# Patient Record
Sex: FEMALE | Race: WHITE | NOT HISPANIC OR LATINO | Employment: FULL TIME | URBAN - METROPOLITAN AREA
[De-identification: names, ages, dates, MRNs, and addresses within clinical notes are randomized per-mention and may not be internally consistent; named-entity substitution may affect disease eponyms.]

---

## 2023-08-31 LAB
CREATININE (MG/DL) IN SER/PLAS: 0.82 MG/DL (ref 0.5–1.05)
GFR FEMALE: 86 ML/MIN/1.73M2
THYROTROPIN (MIU/L) IN SER/PLAS BY DETECTION LIMIT <= 0.05 MIU/L: 1.37 MIU/L (ref 0.44–3.98)
UREA NITROGEN (MG/DL) IN SER/PLAS: 11 MG/DL (ref 6–23)

## 2023-09-12 LAB
CLUE CELLS: NORMAL
NUGENT SCORE: 3
URINE CULTURE: NO GROWTH
YEAST: NORMAL

## 2023-10-07 PROBLEM — M54.9 EXACERBATION OF CHRONIC BACK PAIN: Status: ACTIVE | Noted: 2023-10-07

## 2023-10-07 PROBLEM — J98.01 COUGH DUE TO BRONCHOSPASM: Status: ACTIVE | Noted: 2023-10-07

## 2023-10-07 PROBLEM — R06.02 SOB (SHORTNESS OF BREATH): Status: ACTIVE | Noted: 2023-10-07

## 2023-10-07 PROBLEM — R30.0 DYSURIA: Status: ACTIVE | Noted: 2023-10-07

## 2023-10-07 PROBLEM — G89.29 EXACERBATION OF CHRONIC BACK PAIN: Status: ACTIVE | Noted: 2023-10-07

## 2023-10-07 PROBLEM — E04.1 THYROID NODULE: Status: ACTIVE | Noted: 2023-10-07

## 2023-10-07 PROBLEM — R11.15 EMESIS, PERSISTENT: Status: ACTIVE | Noted: 2023-10-07

## 2023-10-07 PROBLEM — N90.89 LABIAL IRRITATION: Status: ACTIVE | Noted: 2023-10-07

## 2023-10-07 RX ORDER — CYCLOBENZAPRINE HCL 5 MG
.5-1 TABLET ORAL NIGHTLY PRN
COMMUNITY
Start: 2018-09-10 | End: 2023-10-24 | Stop reason: ENTERED-IN-ERROR

## 2023-10-07 RX ORDER — LIDOCAINE HYDROCHLORIDE 20 MG/ML
5 SOLUTION OROPHARYNGEAL EVERY 2 HOUR PRN
COMMUNITY
End: 2023-10-24 | Stop reason: ENTERED-IN-ERROR

## 2023-10-07 RX ORDER — PHENAZOPYRIDINE HYDROCHLORIDE 200 MG/1
200 TABLET, FILM COATED ORAL 3 TIMES DAILY PRN
COMMUNITY
End: 2023-10-24 | Stop reason: ENTERED-IN-ERROR

## 2023-10-07 RX ORDER — ALBUTEROL SULFATE 90 UG/1
1-2 AEROSOL, METERED RESPIRATORY (INHALATION) AS NEEDED
COMMUNITY
End: 2023-10-24 | Stop reason: ENTERED-IN-ERROR

## 2023-10-07 RX ORDER — OMEPRAZOLE 40 MG/1
40 CAPSULE, DELAYED RELEASE ORAL DAILY
COMMUNITY
End: 2023-10-24 | Stop reason: ENTERED-IN-ERROR

## 2023-10-09 ENCOUNTER — ANCILLARY PROCEDURE (OUTPATIENT)
Dept: PREADMISSION TESTING | Facility: HOSPITAL | Age: 53
End: 2023-10-09
Payer: COMMERCIAL

## 2023-10-09 ENCOUNTER — LAB (OUTPATIENT)
Dept: LAB | Facility: LAB | Age: 53
End: 2023-10-09
Payer: COMMERCIAL

## 2023-10-09 VITALS
HEART RATE: 76 BPM | BODY MASS INDEX: 26.12 KG/M2 | TEMPERATURE: 97 F | DIASTOLIC BLOOD PRESSURE: 71 MMHG | HEIGHT: 65 IN | RESPIRATION RATE: 16 BRPM | SYSTOLIC BLOOD PRESSURE: 124 MMHG | WEIGHT: 156.75 LBS | OXYGEN SATURATION: 97 %

## 2023-10-09 DIAGNOSIS — Z01.818 PREOP EXAMINATION: ICD-10-CM

## 2023-10-09 DIAGNOSIS — E04.1 NONTOXIC THYROID NODULE: Primary | ICD-10-CM

## 2023-10-09 DIAGNOSIS — E04.1 NONTOXIC THYROID NODULE: ICD-10-CM

## 2023-10-09 LAB
ANION GAP SERPL CALC-SCNC: 9 MMOL/L (ref 10–20)
BUN SERPL-MCNC: 10 MG/DL (ref 6–23)
CALCIUM SERPL-MCNC: 9.2 MG/DL (ref 8.6–10.3)
CHLORIDE SERPL-SCNC: 107 MMOL/L (ref 98–107)
CO2 SERPL-SCNC: 28 MMOL/L (ref 21–32)
CREAT SERPL-MCNC: 0.7 MG/DL (ref 0.5–1.05)
GFR SERPL CREATININE-BSD FRML MDRD: >90 ML/MIN/1.73M*2
GLUCOSE SERPL-MCNC: 90 MG/DL (ref 74–99)
POTASSIUM SERPL-SCNC: 4.2 MMOL/L (ref 3.5–5.3)
SODIUM SERPL-SCNC: 140 MMOL/L (ref 136–145)

## 2023-10-09 PROCEDURE — 36415 COLL VENOUS BLD VENIPUNCTURE: CPT

## 2023-10-09 PROCEDURE — 80048 BASIC METABOLIC PNL TOTAL CA: CPT

## 2023-10-09 PROCEDURE — 99203 OFFICE O/P NEW LOW 30 MIN: CPT

## 2023-10-09 PROCEDURE — 93005 ELECTROCARDIOGRAM TRACING: CPT

## 2023-10-09 PROCEDURE — 93010 ELECTROCARDIOGRAM REPORT: CPT | Performed by: INTERNAL MEDICINE

## 2023-10-09 RX ORDER — IBUPROFEN 200 MG
800 TABLET ORAL EVERY 6 HOURS PRN
COMMUNITY

## 2023-10-09 RX ORDER — PLANT STANOL ESTER 450 MG
8000 TABLET ORAL DAILY
COMMUNITY
End: 2023-10-24 | Stop reason: ENTERED-IN-ERROR

## 2023-10-09 RX ORDER — VIT C/E/ZN/COPPR/LUTEIN/ZEAXAN 250MG-90MG
25 CAPSULE ORAL DAILY
COMMUNITY

## 2023-10-09 ASSESSMENT — DUKE ACTIVITY SCORE INDEX (DASI)
CAN YOU DO HEAVY WORK AROUND THE HOUSE LIKE SCRUBBING FLOORS OR LIFTING AND MOVING HEAVY FURNITURE: YES
CAN YOU PARTICIPATE IN STRENOUS SPORTS LIKE SWIMMING, SINGLES TENNIS, FOOTBALL, BASKETBALL, OR SKIING: YES
CAN YOU RUN A SHORT DISTANCE: YES
CAN YOU WALK A BLOCK OR TWO ON LEVEL GROUND: YES
CAN YOU TAKE CARE OF YOURSELF (EAT, DRESS, BATHE, OR USE TOILET): YES
CAN YOU DO MODERATE WORK AROUND THE HOUSE LIKE VACUUMING, SWEEPING FLOORS OR CARRYING GROCERIES: YES
CAN YOU CLIMB A FLIGHT OF STAIRS OR WALK UP A HILL: YES
CAN YOU DO LIGHT WORK AROUND THE HOUSE LIKE DUSTING OR WASHING DISHES: YES
CAN YOU PARTICIPATE IN MODERATE RECREATIONAL ACTIVITIES LIKE GOLF, BOWLING, DANCING, DOUBLES TENNIS OR THROWING A BASEBALL OR FOOTBALL: YES
CAN YOU WALK INDOORS, SUCH AS AROUND YOUR HOUSE: YES
CAN YOU HAVE SEXUAL RELATIONS: YES

## 2023-10-09 ASSESSMENT — LIFESTYLE VARIABLES: SMOKING_STATUS: SMOKER

## 2023-10-09 ASSESSMENT — CHADS2 SCORE
AGE GREATER THAN OR EQUAL TO 75: NO
HYPERTENSION: NO
PRIOR STROKE OR TIA OR THROMBOEMBOLISM: NO
CHADS2 SCORE: 0
DIABETES: NO
CHF: NO

## 2023-10-09 ASSESSMENT — ACTIVITIES OF DAILY LIVING (ADL): ADL_SCORE: 0

## 2023-10-09 ASSESSMENT — PAIN SCALES - GENERAL: PAINLEVEL_OUTOF10: 0 - NO PAIN

## 2023-10-09 ASSESSMENT — PAIN - FUNCTIONAL ASSESSMENT: PAIN_FUNCTIONAL_ASSESSMENT: 0-10

## 2023-10-09 NOTE — H&P (VIEW-ONLY)
"CPM/PAT Evaluation       Name: Jaki Sarah (Jaki Sarah)  /Age: 1970/52 y.o.     In-Person       Chief Complaint: Goiter    HPI  Pleasant 51 y/o female presents with a goiter. She states she has had a goiter for years now but it has been growing in size. States she has \"all the symptoms\" associated with a goiter. She has \"itching\" of the goiter. Endorses fatigue, muscle cramping/spasms and \"many other symptoms\". Denies recent fever/illness/chills.     Past Medical History:   Diagnosis Date    Anxiety     Asthma     Depression     GERD (gastroesophageal reflux disease)     Goiter     Thyroid nodule        Past Surgical History:   Procedure Laterality Date    ADENOIDECTOMY      MYRINGOTOMY W/ TUBES         Patient  has no history on file for sexual activity.    Family History   Problem Relation Name Age of Onset    Diabetes Father      Kidney failure Father      Other (Heart problem) Father         Allergies   Allergen Reactions    Guaifenesin Hives    Sulfamethoxazole-Trimethoprim Other     Yeast infection       Prior to Admission medications    Medication Sig Start Date End Date Taking? Authorizing Provider   albuterol 90 mcg/actuation inhaler Inhale 1-2 puffs if needed. Every 4-6 hours    Historical Provider, MD   ascorbic acid (VITAMIN C ORAL)     Historical Provider, MD   CALCIUM-MAGNESIUM-ZINC ORAL TABS    Historical Provider, MD   cyclobenzaprine (Flexeril) 5 mg tablet Take 0.5-1 tablets (2.5-5 mg) by mouth as needed at bedtime (pain). 9/10/18   Historical Provider, MD   lidocaine (Xylocaine) 2 % solution Take 5 mL by mouth every 2 hours if needed.    Historical Provider, MD   omeprazole (PriLOSEC) 20 mg DR capsule     Historical Provider, MD   phenazopyridine (Pyridium) 200 mg tablet Take 1 tablet (200 mg) by mouth 3 times a day as needed. After meals    Historical Provider, MD   TURMERIC ORAL     Historical Provider, MD   vitamin E acetate (VITAMIN E ORAL)     Historical Provider, MD    "     Constitutional: Negative for fever, chills, or sweats   ENMT: Negative for nasal discharge, congestion, ear pain, mouth pain, throat pain. Positive for nasal congestion. Positive for goiter.   Respiratory: Negative for cough, hemoptysis, wheezing, shortness of breath. Positive for chronic cough. States due to smoking.    Cardiac: Negative for chest pain, dyspnea on exertion, palpitations, syncope. Recent palpitations at night only. Believes it may be related to stress. Occasional LE swelling  Gastrointestinal: Negative for nausea, vomiting, diarrhea, constipation, abdominal pain  Genitourinary: Negative for dysuria, flank pain, frequency, hematuria   Musculoskeletal: Negative for decreased ROM, pain, swelling, weakness. Positive for muscle cramping/pain.   Neurological: Negative for dizziness, confusion, headache  Psychiatric: Negative for mood changes   Skin: Negative for itching, rash, ulcer    Hematologic/Lymph: Negative for bruising, easy bleeding  Allergic/Immunologic: itching, sneezing, swelling      Physical Exam  Vitals reviewed.   HENT:      Head: Normocephalic.      Nose: Nose normal.      Mouth/Throat:      Mouth: Mucous membranes are moist.      Pharynx: Oropharynx is clear.   Eyes:      Pupils: Pupils are equal, round, and reactive to light.   Neck:      Comments: Goiter    Cardiovascular:      Rate and Rhythm: Normal rate and regular rhythm.   Pulmonary:      Effort: Pulmonary effort is normal.      Breath sounds: Normal breath sounds.   Abdominal:      General: Bowel sounds are normal.      Palpations: Abdomen is soft.   Musculoskeletal:         General: Normal range of motion.      Cervical back: Normal range of motion.   Skin:     General: Skin is warm and dry.   Neurological:      General: No focal deficit present.      Mental Status: She is alert and oriented to person, place, and time.   Psychiatric:         Mood and Affect: Mood normal.        Airway    Visit Vitals  /71   Pulse 76    Temp 36.1 °C (97 °F) (Temporal)   Resp 16       DASI Risk Score    No data to display       Caprini DVT Assessment    No data to display       Modified Frailty Index    No data to display       CHADS2 Stroke Risk  Current as of 6 hours ago        N/A 3 - 100%: High Risk   2 - 3%: Medium Risk   0 - 2%: Low Risk     Last Change: N/A          This score determines the patient's risk of having a stroke if the patient has atrial fibrillation.        This score is not applicable to this patient. Components are not calculated.          Revised Cardiac Risk Index    No data to display       Apfel Simplified Score    No data to display       Risk Analysis Index Results This Encounter    No data found in the last 1 encounters.         Assessment and Plan:     OR with Dr. Mobley on 10/26  BMP ordered  EKG obtained and enclosed. NSR. VR: 67 BPM  Patient has no prior anesthesia complications. Has tolerated anesthesia well in the past.     Patient endorses palpitations recently. She noticed they began when she scheduled this surgery. They occur at night only when she initially lays down and then she feels better after she relaxes in bed. EKG obtained which showed NSR. Heart auscultation was WNL.     Patient has never been officially diagnosed with sleep apnea. She states that she wakes up gasping and sleeps on 3 pillows every night. She is aware that she should have a sleep study completed.       See risk scores as previously documented.

## 2023-10-09 NOTE — CPM/PAT H&P
"CPM/PAT Evaluation       Name: Jaki Sarah (Jaki Sarah)  /Age: 1970/52 y.o.     In-Person       Chief Complaint: Goiter    HPI  Pleasant 53 y/o female presents with a goiter. She states she has had a goiter for years now but it has been growing in size. States she has \"all the symptoms\" associated with a goiter. She has \"itching\" of the goiter. Endorses fatigue, muscle cramping/spasms and \"many other symptoms\". Denies recent fever/illness/chills.     Past Medical History:   Diagnosis Date    Anxiety     Asthma     Depression     GERD (gastroesophageal reflux disease)     Goiter     Thyroid nodule        Past Surgical History:   Procedure Laterality Date    ADENOIDECTOMY      MYRINGOTOMY W/ TUBES         Patient  has no history on file for sexual activity.    Family History   Problem Relation Name Age of Onset    Diabetes Father      Kidney failure Father      Other (Heart problem) Father         Allergies   Allergen Reactions    Guaifenesin Hives    Sulfamethoxazole-Trimethoprim Other     Yeast infection       Prior to Admission medications    Medication Sig Start Date End Date Taking? Authorizing Provider   albuterol 90 mcg/actuation inhaler Inhale 1-2 puffs if needed. Every 4-6 hours    Historical Provider, MD   ascorbic acid (VITAMIN C ORAL)     Historical Provider, MD   CALCIUM-MAGNESIUM-ZINC ORAL TABS    Historical Provider, MD   cyclobenzaprine (Flexeril) 5 mg tablet Take 0.5-1 tablets (2.5-5 mg) by mouth as needed at bedtime (pain). 9/10/18   Historical Provider, MD   lidocaine (Xylocaine) 2 % solution Take 5 mL by mouth every 2 hours if needed.    Historical Provider, MD   omeprazole (PriLOSEC) 20 mg DR capsule     Historical Provider, MD   phenazopyridine (Pyridium) 200 mg tablet Take 1 tablet (200 mg) by mouth 3 times a day as needed. After meals    Historical Provider, MD   TURMERIC ORAL     Historical Provider, MD   vitamin E acetate (VITAMIN E ORAL)     Historical Provider, MD    "     Constitutional: Negative for fever, chills, or sweats   ENMT: Negative for nasal discharge, congestion, ear pain, mouth pain, throat pain. Positive for nasal congestion. Positive for goiter.   Respiratory: Negative for cough, hemoptysis, wheezing, shortness of breath. Positive for chronic cough. States due to smoking.    Cardiac: Negative for chest pain, dyspnea on exertion, palpitations, syncope. Recent palpitations at night only. Believes it may be related to stress. Occasional LE swelling  Gastrointestinal: Negative for nausea, vomiting, diarrhea, constipation, abdominal pain  Genitourinary: Negative for dysuria, flank pain, frequency, hematuria   Musculoskeletal: Negative for decreased ROM, pain, swelling, weakness. Positive for muscle cramping/pain.   Neurological: Negative for dizziness, confusion, headache  Psychiatric: Negative for mood changes   Skin: Negative for itching, rash, ulcer    Hematologic/Lymph: Negative for bruising, easy bleeding  Allergic/Immunologic: itching, sneezing, swelling      Physical Exam  Vitals reviewed.   HENT:      Head: Normocephalic.      Nose: Nose normal.      Mouth/Throat:      Mouth: Mucous membranes are moist.      Pharynx: Oropharynx is clear.   Eyes:      Pupils: Pupils are equal, round, and reactive to light.   Neck:      Comments: Goiter    Cardiovascular:      Rate and Rhythm: Normal rate and regular rhythm.   Pulmonary:      Effort: Pulmonary effort is normal.      Breath sounds: Normal breath sounds.   Abdominal:      General: Bowel sounds are normal.      Palpations: Abdomen is soft.   Musculoskeletal:         General: Normal range of motion.      Cervical back: Normal range of motion.   Skin:     General: Skin is warm and dry.   Neurological:      General: No focal deficit present.      Mental Status: She is alert and oriented to person, place, and time.   Psychiatric:         Mood and Affect: Mood normal.        Airway    Visit Vitals  /71   Pulse 76    Temp 36.1 °C (97 °F) (Temporal)   Resp 16       DASI Risk Score    No data to display       Caprini DVT Assessment    No data to display       Modified Frailty Index    No data to display       CHADS2 Stroke Risk  Current as of 6 hours ago        N/A 3 - 100%: High Risk   2 - 3%: Medium Risk   0 - 2%: Low Risk     Last Change: N/A          This score determines the patient's risk of having a stroke if the patient has atrial fibrillation.        This score is not applicable to this patient. Components are not calculated.          Revised Cardiac Risk Index    No data to display       Apfel Simplified Score    No data to display       Risk Analysis Index Results This Encounter    No data found in the last 1 encounters.         Assessment and Plan:     OR with Dr. Mobley on 10/26  BMP ordered  EKG obtained and enclosed. NSR. VR: 67 BPM  Patient has no prior anesthesia complications. Has tolerated anesthesia well in the past.     Patient endorses palpitations recently. She noticed they began when she scheduled this surgery. They occur at night only when she initially lays down and then she feels better after she relaxes in bed. EKG obtained which showed NSR. Heart auscultation was WNL.     Patient has never been officially diagnosed with sleep apnea. She states that she wakes up gasping and sleeps on 3 pillows every night. She is aware that she should have a sleep study completed.       See risk scores as previously documented.

## 2023-10-09 NOTE — PREPROCEDURE INSTRUCTIONS
Medication List            Accurate as of October 9, 2023  2:33 PM. Always use your most recent med list.                albuterol 90 mcg/actuation inhaler  Medication Adjustments for Surgery: Take morning of surgery with sip of water, no other fluids     B complex-vitamin C-folic acid 1- mg-mg-mcg tablet  Commonly known as: Nephro-[facility] Rx  Medication Adjustments for Surgery: Stop 7 days before surgery     CALCIUM-MAGNESIUM-ZINC ORAL  Medication Adjustments for Surgery: Stop 7 days before surgery     cholecalciferol 25 MCG (1000 UT) capsule  Commonly known as: Vitamin D-3  Medication Adjustments for Surgery: Continue until night before surgery     cyanocobalamin 50 mcg tablet  Commonly known as: Vitamin B-12  Medication Adjustments for Surgery: Stop 7 days before surgery     cyclobenzaprine 5 mg tablet  Commonly known as: Flexeril  Medication Adjustments for Surgery: Other (Comment)  Notes to patient: Not taking      ibuprofen 200 mg tablet  Medication Adjustments for Surgery: Stop 7 days before surgery     lidocaine 2 % solution  Commonly known as: Xylocaine  Medication Adjustments for Surgery: Stop 1 day before surgery     omeprazole 40 mg DR capsule  Commonly known as: PriLOSEC  Medication Adjustments for Surgery: Take morning of surgery with sip of water, no other fluids     phenazopyridine 200 mg tablet  Commonly known as: Pyridium  Medication Adjustments for Surgery: Continue until night before surgery     TURMERIC ORAL  Medication Adjustments for Surgery: Stop 7 days before surgery     vitamin A 2,400 mcg capsule  Medication Adjustments for Surgery: Stop 7 days before surgery     VITAMIN C ORAL  Medication Adjustments for Surgery: Stop 7 days before surgery     VITAMIN E ORAL  Medication Adjustments for Surgery: Stop 7 days before surgery                      PRE-OPERATIVE INSTRUCTIONS    You will receive notification one business day prior to your surgery to confirm your arrival time and additional  information. It is important that you answer your phone and/or check your messages during this time.    Please enter the building through the Outpatient entrance. Take the elevator off the lobby to the 2nd floor and check in at the Outpatient Surgery desk    INSTRUCTIONS:  Talk to your surgeon for instructions if you should stop your aspirin, blood thinner, or diabetes medicines.  DO NOT take any multivitamins or over the counter supplements for 7-10 days before surgery.  If not being admitted, you must have an adult immediately available to drive you home after surgery. We also highly recommend you have someone stay with you for the entire day and night of your surgery.  For children having surgery, a parent or legal guardian must accompany t hem to the surgery center. If this is not possible, please call [phone] to make additional arrangements.  For adults who are unable to consent or make medical decisions for themselves, a legal guardian or Power of  must accompany them to the surgery center. If this is not possible, please call [phone] to make additional arrangements.  Wear comfortable, loose fitting clothing.  All jewelry and piercings must be removed. If you are unable to remove an item or have a dermal piercing, please be sure to tell the nurse when you arrive for surgery.  Nail polish and make-up must be removed.  Avoid smoking or consuming alcohol for 24 hours before surgery.  To help prevent infection, please take a shower/bath and wash your hair the night before and/or morning of surgery.    Additional instructions about eating and drinking before surgery:  Do not eat any solid foods or drink anything but clear liquids within 6 hours of your arrival time for surgery. Milk, nutritional drinks/supplements, and infant formula are considered solid foods.  You may drink clear liquids up to 2 hours before your arrival time for surgery, unless directed otherwise by your surgeon. Clear liquids  include water, non-carbonated sports drinks (Gatorade), black tea or coffee (no creamers) and breast milk.    If you received a blue folder, please review additional information provided inside the folder regarding additional preparation.     If you have any questions or concerns, please call Pre-Admission Testing at [phone].

## 2023-10-11 LAB
ATRIAL RATE: 67 BPM
P AXIS: 65 DEGREES
P OFFSET: 192 MS
P ONSET: 145 MS
PR INTERVAL: 148 MS
Q ONSET: 219 MS
QRS COUNT: 11 BEATS
QRS DURATION: 78 MS
QT INTERVAL: 386 MS
QTC CALCULATION(BAZETT): 407 MS
QTC FREDERICIA: 400 MS
R AXIS: 71 DEGREES
T AXIS: 61 DEGREES
T OFFSET: 412 MS
VENTRICULAR RATE: 67 BPM

## 2023-10-24 RX ORDER — OMEPRAZOLE 20 MG/1
20 TABLET, DELAYED RELEASE ORAL DAILY PRN
COMMUNITY

## 2023-10-26 ENCOUNTER — HOSPITAL ENCOUNTER (OUTPATIENT)
Facility: HOSPITAL | Age: 53
Setting detail: OBSERVATION
Discharge: HOME | End: 2023-10-27
Attending: OTOLARYNGOLOGY | Admitting: OTOLARYNGOLOGY
Payer: COMMERCIAL

## 2023-10-26 ENCOUNTER — ANESTHESIA (OUTPATIENT)
Dept: OPERATING ROOM | Facility: HOSPITAL | Age: 53
End: 2023-10-26
Payer: COMMERCIAL

## 2023-10-26 ENCOUNTER — ANESTHESIA EVENT (OUTPATIENT)
Dept: OPERATING ROOM | Facility: HOSPITAL | Age: 53
End: 2023-10-26
Payer: COMMERCIAL

## 2023-10-26 DIAGNOSIS — G89.18 ACUTE POSTOPERATIVE PAIN: ICD-10-CM

## 2023-10-26 DIAGNOSIS — E04.9 GOITER: Primary | ICD-10-CM

## 2023-10-26 DIAGNOSIS — E04.1 THYROID NODULE: ICD-10-CM

## 2023-10-26 DIAGNOSIS — E04.1 NONTOXIC SINGLE THYROID NODULE: ICD-10-CM

## 2023-10-26 PROBLEM — J45.909 ASTHMA (HHS-HCC): Status: ACTIVE | Noted: 2023-10-26

## 2023-10-26 LAB
HCG UR QL IA.RAPID: NEGATIVE
PTH-INTACT IO % DIF SERPL: 70 PG/ML (ref 12–88)

## 2023-10-26 PROCEDURE — 36415 COLL VENOUS BLD VENIPUNCTURE: CPT | Performed by: STUDENT IN AN ORGANIZED HEALTH CARE EDUCATION/TRAINING PROGRAM

## 2023-10-26 PROCEDURE — 3600000009 HC OR TIME - EACH INCREMENTAL 1 MINUTE - PROCEDURE LEVEL FOUR: Performed by: OTOLARYNGOLOGY

## 2023-10-26 PROCEDURE — 7100000001 HC RECOVERY ROOM TIME - INITIAL BASE CHARGE: Performed by: OTOLARYNGOLOGY

## 2023-10-26 PROCEDURE — 81025 URINE PREGNANCY TEST: CPT | Performed by: OTOLARYNGOLOGY

## 2023-10-26 PROCEDURE — 2500000005 HC RX 250 GENERAL PHARMACY W/O HCPCS: Performed by: OTOLARYNGOLOGY

## 2023-10-26 PROCEDURE — 7100000002 HC RECOVERY ROOM TIME - EACH INCREMENTAL 1 MINUTE: Performed by: OTOLARYNGOLOGY

## 2023-10-26 PROCEDURE — 2500000004 HC RX 250 GENERAL PHARMACY W/ HCPCS (ALT 636 FOR OP/ED): Performed by: STUDENT IN AN ORGANIZED HEALTH CARE EDUCATION/TRAINING PROGRAM

## 2023-10-26 PROCEDURE — 2500000004 HC RX 250 GENERAL PHARMACY W/ HCPCS (ALT 636 FOR OP/ED): Performed by: ANESTHESIOLOGIST ASSISTANT

## 2023-10-26 PROCEDURE — G0378 HOSPITAL OBSERVATION PER HR: HCPCS

## 2023-10-26 PROCEDURE — 88307 TISSUE EXAM BY PATHOLOGIST: CPT | Performed by: PATHOLOGY

## 2023-10-26 PROCEDURE — 2720000007 HC OR 272 NO HCPCS: Performed by: OTOLARYNGOLOGY

## 2023-10-26 PROCEDURE — 3700000001 HC GENERAL ANESTHESIA TIME - INITIAL BASE CHARGE: Performed by: OTOLARYNGOLOGY

## 2023-10-26 PROCEDURE — 2500000005 HC RX 250 GENERAL PHARMACY W/O HCPCS: Performed by: ANESTHESIOLOGIST ASSISTANT

## 2023-10-26 PROCEDURE — A60240 PR THYROIDECTOMY: Performed by: ANESTHESIOLOGY

## 2023-10-26 PROCEDURE — 3700000002 HC GENERAL ANESTHESIA TIME - EACH INCREMENTAL 1 MINUTE: Performed by: OTOLARYNGOLOGY

## 2023-10-26 PROCEDURE — 31525 DX LARYNGOSCOPY EXCL NB: CPT | Performed by: OTOLARYNGOLOGY

## 2023-10-26 PROCEDURE — 96372 THER/PROPH/DIAG INJ SC/IM: CPT | Performed by: STUDENT IN AN ORGANIZED HEALTH CARE EDUCATION/TRAINING PROGRAM

## 2023-10-26 PROCEDURE — 3600000004 HC OR TIME - INITIAL BASE CHARGE - PROCEDURE LEVEL FOUR: Performed by: OTOLARYNGOLOGY

## 2023-10-26 PROCEDURE — 0754T DGTZ GLS MCRSCP SLD LEVEL V: CPT | Mod: TC,STJLAB | Performed by: OTOLARYNGOLOGY

## 2023-10-26 PROCEDURE — 60240 REMOVAL OF THYROID: CPT | Performed by: OTOLARYNGOLOGY

## 2023-10-26 PROCEDURE — A60240 PR THYROIDECTOMY: Performed by: ANESTHESIOLOGIST ASSISTANT

## 2023-10-26 PROCEDURE — 2500000004 HC RX 250 GENERAL PHARMACY W/ HCPCS (ALT 636 FOR OP/ED): Performed by: ANESTHESIOLOGY

## 2023-10-26 PROCEDURE — 83970 ASSAY OF PARATHORMONE: CPT | Performed by: STUDENT IN AN ORGANIZED HEALTH CARE EDUCATION/TRAINING PROGRAM

## 2023-10-26 RX ORDER — LIDOCAINE HYDROCHLORIDE 20 MG/ML
INJECTION, SOLUTION EPIDURAL; INFILTRATION; INTRACAUDAL; PERINEURAL AS NEEDED
Status: DISCONTINUED | OUTPATIENT
Start: 2023-10-26 | End: 2023-10-26

## 2023-10-26 RX ORDER — ONDANSETRON HYDROCHLORIDE 2 MG/ML
INJECTION, SOLUTION INTRAVENOUS AS NEEDED
Status: DISCONTINUED | OUTPATIENT
Start: 2023-10-26 | End: 2023-10-26

## 2023-10-26 RX ORDER — GLYCOPYRROLATE 0.2 MG/ML
INJECTION INTRAMUSCULAR; INTRAVENOUS AS NEEDED
Status: DISCONTINUED | OUTPATIENT
Start: 2023-10-26 | End: 2023-10-26

## 2023-10-26 RX ORDER — DIPHENHYDRAMINE HYDROCHLORIDE 50 MG/ML
12.5 INJECTION INTRAMUSCULAR; INTRAVENOUS ONCE AS NEEDED
Status: DISCONTINUED | OUTPATIENT
Start: 2023-10-26 | End: 2023-10-26 | Stop reason: HOSPADM

## 2023-10-26 RX ORDER — ACETAMINOPHEN 325 MG/1
975 TABLET ORAL EVERY 8 HOURS SCHEDULED
Status: DISCONTINUED | OUTPATIENT
Start: 2023-10-26 | End: 2023-10-27 | Stop reason: HOSPADM

## 2023-10-26 RX ORDER — OXYCODONE AND ACETAMINOPHEN 5; 325 MG/1; MG/1
1 TABLET ORAL EVERY 4 HOURS PRN
Status: DISCONTINUED | OUTPATIENT
Start: 2023-10-26 | End: 2023-10-26 | Stop reason: HOSPADM

## 2023-10-26 RX ORDER — PROPOFOL 10 MG/ML
INJECTION, EMULSION INTRAVENOUS AS NEEDED
Status: DISCONTINUED | OUTPATIENT
Start: 2023-10-26 | End: 2023-10-26

## 2023-10-26 RX ORDER — LIDOCAINE HYDROCHLORIDE AND EPINEPHRINE 10; 10 MG/ML; UG/ML
INJECTION, SOLUTION INFILTRATION; PERINEURAL AS NEEDED
Status: DISCONTINUED | OUTPATIENT
Start: 2023-10-26 | End: 2023-10-26 | Stop reason: HOSPADM

## 2023-10-26 RX ORDER — HYDRALAZINE HYDROCHLORIDE 20 MG/ML
5 INJECTION INTRAMUSCULAR; INTRAVENOUS EVERY 30 MIN PRN
Status: DISCONTINUED | OUTPATIENT
Start: 2023-10-26 | End: 2023-10-26 | Stop reason: HOSPADM

## 2023-10-26 RX ORDER — LEVOTHYROXINE SODIUM 112 UG/1
112 TABLET ORAL
Status: DISCONTINUED | OUTPATIENT
Start: 2023-10-27 | End: 2023-10-27 | Stop reason: HOSPADM

## 2023-10-26 RX ORDER — NALOXONE HYDROCHLORIDE 0.4 MG/ML
0.2 INJECTION, SOLUTION INTRAMUSCULAR; INTRAVENOUS; SUBCUTANEOUS EVERY 5 MIN PRN
Status: DISCONTINUED | OUTPATIENT
Start: 2023-10-26 | End: 2023-10-27 | Stop reason: HOSPADM

## 2023-10-26 RX ORDER — FENTANYL CITRATE 50 UG/ML
INJECTION, SOLUTION INTRAMUSCULAR; INTRAVENOUS AS NEEDED
Status: DISCONTINUED | OUTPATIENT
Start: 2023-10-26 | End: 2023-10-26

## 2023-10-26 RX ORDER — SUCCINYLCHOLINE CHLORIDE 100 MG/5ML
SYRINGE (ML) INTRAVENOUS AS NEEDED
Status: DISCONTINUED | OUTPATIENT
Start: 2023-10-26 | End: 2023-10-26

## 2023-10-26 RX ORDER — MIDAZOLAM HYDROCHLORIDE 1 MG/ML
INJECTION, SOLUTION INTRAMUSCULAR; INTRAVENOUS AS NEEDED
Status: DISCONTINUED | OUTPATIENT
Start: 2023-10-26 | End: 2023-10-26

## 2023-10-26 RX ORDER — LABETALOL HYDROCHLORIDE 5 MG/ML
5 INJECTION, SOLUTION INTRAVENOUS ONCE AS NEEDED
Status: DISCONTINUED | OUTPATIENT
Start: 2023-10-26 | End: 2023-10-26 | Stop reason: HOSPADM

## 2023-10-26 RX ORDER — HYDROMORPHONE HYDROCHLORIDE 1 MG/ML
0.2 INJECTION, SOLUTION INTRAMUSCULAR; INTRAVENOUS; SUBCUTANEOUS EVERY 5 MIN PRN
Status: DISCONTINUED | OUTPATIENT
Start: 2023-10-26 | End: 2023-10-26 | Stop reason: HOSPADM

## 2023-10-26 RX ORDER — PHENYLEPHRINE 10 MG/250 ML(40 MCG/ML)IN 0.9 % SOD.CHLORIDE INTRAVENOUS
CONTINUOUS PRN
Status: DISCONTINUED | OUTPATIENT
Start: 2023-10-26 | End: 2023-10-26

## 2023-10-26 RX ORDER — ONDANSETRON HYDROCHLORIDE 2 MG/ML
4 INJECTION, SOLUTION INTRAVENOUS EVERY 8 HOURS PRN
Status: DISCONTINUED | OUTPATIENT
Start: 2023-10-26 | End: 2023-10-27 | Stop reason: HOSPADM

## 2023-10-26 RX ORDER — ONDANSETRON 4 MG/1
4 TABLET, ORALLY DISINTEGRATING ORAL EVERY 8 HOURS PRN
Status: DISCONTINUED | OUTPATIENT
Start: 2023-10-26 | End: 2023-10-27 | Stop reason: HOSPADM

## 2023-10-26 RX ORDER — ENOXAPARIN SODIUM 100 MG/ML
40 INJECTION SUBCUTANEOUS EVERY 24 HOURS
Status: DISCONTINUED | OUTPATIENT
Start: 2023-10-26 | End: 2023-10-27 | Stop reason: HOSPADM

## 2023-10-26 RX ORDER — OXYCODONE HYDROCHLORIDE 5 MG/1
2.5 TABLET ORAL EVERY 4 HOURS PRN
Status: DISCONTINUED | OUTPATIENT
Start: 2023-10-26 | End: 2023-10-27 | Stop reason: HOSPADM

## 2023-10-26 RX ORDER — CEFAZOLIN SODIUM 2 G/100ML
INJECTION, SOLUTION INTRAVENOUS AS NEEDED
Status: DISCONTINUED | OUTPATIENT
Start: 2023-10-26 | End: 2023-10-26

## 2023-10-26 RX ORDER — ALBUTEROL SULFATE 0.83 MG/ML
2.5 SOLUTION RESPIRATORY (INHALATION) ONCE AS NEEDED
Status: DISCONTINUED | OUTPATIENT
Start: 2023-10-26 | End: 2023-10-26 | Stop reason: HOSPADM

## 2023-10-26 RX ORDER — HYDROMORPHONE HYDROCHLORIDE 1 MG/ML
INJECTION, SOLUTION INTRAMUSCULAR; INTRAVENOUS; SUBCUTANEOUS AS NEEDED
Status: DISCONTINUED | OUTPATIENT
Start: 2023-10-26 | End: 2023-10-26

## 2023-10-26 RX ORDER — SODIUM CHLORIDE, SODIUM LACTATE, POTASSIUM CHLORIDE, CALCIUM CHLORIDE 600; 310; 30; 20 MG/100ML; MG/100ML; MG/100ML; MG/100ML
100 INJECTION, SOLUTION INTRAVENOUS CONTINUOUS
Status: DISCONTINUED | OUTPATIENT
Start: 2023-10-26 | End: 2023-10-26 | Stop reason: HOSPADM

## 2023-10-26 RX ORDER — TRAMADOL HYDROCHLORIDE 50 MG/1
50 TABLET ORAL EVERY 4 HOURS PRN
Status: DISCONTINUED | OUTPATIENT
Start: 2023-10-26 | End: 2023-10-27 | Stop reason: HOSPADM

## 2023-10-26 RX ORDER — OXYCODONE HYDROCHLORIDE 5 MG/1
5 TABLET ORAL EVERY 4 HOURS PRN
Status: DISCONTINUED | OUTPATIENT
Start: 2023-10-26 | End: 2023-10-26 | Stop reason: HOSPADM

## 2023-10-26 RX ORDER — ONDANSETRON HYDROCHLORIDE 2 MG/ML
4 INJECTION, SOLUTION INTRAVENOUS ONCE AS NEEDED
Status: COMPLETED | OUTPATIENT
Start: 2023-10-26 | End: 2023-10-26

## 2023-10-26 RX ORDER — POLYETHYLENE GLYCOL 3350 17 G/17G
17 POWDER, FOR SOLUTION ORAL DAILY
Status: DISCONTINUED | OUTPATIENT
Start: 2023-10-26 | End: 2023-10-27 | Stop reason: HOSPADM

## 2023-10-26 RX ORDER — DEXAMETHASONE SODIUM PHOSPHATE 100 MG/10ML
INJECTION INTRAMUSCULAR; INTRAVENOUS AS NEEDED
Status: DISCONTINUED | OUTPATIENT
Start: 2023-10-26 | End: 2023-10-26

## 2023-10-26 RX ORDER — LIDOCAINE HYDROCHLORIDE 10 MG/ML
0.1 INJECTION, SOLUTION EPIDURAL; INFILTRATION; INTRACAUDAL; PERINEURAL ONCE
Status: DISCONTINUED | OUTPATIENT
Start: 2023-10-26 | End: 2023-10-26 | Stop reason: HOSPADM

## 2023-10-26 RX ORDER — HYDROMORPHONE HYDROCHLORIDE 1 MG/ML
0.5 INJECTION, SOLUTION INTRAMUSCULAR; INTRAVENOUS; SUBCUTANEOUS EVERY 5 MIN PRN
Status: DISCONTINUED | OUTPATIENT
Start: 2023-10-26 | End: 2023-10-26 | Stop reason: HOSPADM

## 2023-10-26 RX ORDER — PHENYLEPHRINE HCL IN 0.9% NACL 1 MG/10 ML
SYRINGE (ML) INTRAVENOUS AS NEEDED
Status: DISCONTINUED | OUTPATIENT
Start: 2023-10-26 | End: 2023-10-26

## 2023-10-26 RX ORDER — ACETAMINOPHEN 325 MG/1
975 TABLET ORAL ONCE
Status: DISCONTINUED | OUTPATIENT
Start: 2023-10-26 | End: 2023-10-26 | Stop reason: HOSPADM

## 2023-10-26 RX ADMIN — Medication 100 MCG: at 12:38

## 2023-10-26 RX ADMIN — ACETAMINOPHEN 975 MG: 325 TABLET ORAL at 17:09

## 2023-10-26 RX ADMIN — PROPOFOL 100 MG: 10 INJECTION, EMULSION INTRAVENOUS at 12:34

## 2023-10-26 RX ADMIN — Medication 2 L/MIN: at 16:45

## 2023-10-26 RX ADMIN — Medication 0.8 MCG/KG/MIN: at 12:35

## 2023-10-26 RX ADMIN — HYDROMORPHONE HYDROCHLORIDE 0.25 MG: 1 INJECTION, SOLUTION INTRAMUSCULAR; INTRAVENOUS; SUBCUTANEOUS at 13:38

## 2023-10-26 RX ADMIN — CEFAZOLIN SODIUM 2 G: 2 INJECTION, SOLUTION INTRAVENOUS at 12:27

## 2023-10-26 RX ADMIN — LIDOCAINE HYDROCHLORIDE 60 MG: 20 INJECTION, SOLUTION EPIDURAL; INFILTRATION; INTRACAUDAL; PERINEURAL at 12:24

## 2023-10-26 RX ADMIN — REMIFENTANIL HYDROCHLORIDE 0.06 MCG/KG/MIN: 1 INJECTION, POWDER, LYOPHILIZED, FOR SOLUTION INTRAVENOUS at 12:31

## 2023-10-26 RX ADMIN — SODIUM CHLORIDE, SODIUM LACTATE, POTASSIUM CHLORIDE, AND CALCIUM CHLORIDE: 600; 310; 30; 20 INJECTION, SOLUTION INTRAVENOUS at 12:19

## 2023-10-26 RX ADMIN — DEXAMETHASONE SODIUM PHOSPHATE 10 MG: 10 INJECTION INTRAMUSCULAR; INTRAVENOUS at 12:27

## 2023-10-26 RX ADMIN — HYDROMORPHONE HYDROCHLORIDE 0.25 MG: 1 INJECTION, SOLUTION INTRAMUSCULAR; INTRAVENOUS; SUBCUTANEOUS at 13:49

## 2023-10-26 RX ADMIN — FENTANYL CITRATE 50 MCG: 50 INJECTION, SOLUTION INTRAMUSCULAR; INTRAVENOUS at 12:24

## 2023-10-26 RX ADMIN — MIDAZOLAM 2 MG: 1 INJECTION INTRAMUSCULAR; INTRAVENOUS at 12:19

## 2023-10-26 RX ADMIN — HYDROMORPHONE HYDROCHLORIDE 0.5 MG: 1 INJECTION, SOLUTION INTRAMUSCULAR; INTRAVENOUS; SUBCUTANEOUS at 14:43

## 2023-10-26 RX ADMIN — FENTANYL CITRATE 50 MCG: 50 INJECTION, SOLUTION INTRAMUSCULAR; INTRAVENOUS at 12:29

## 2023-10-26 RX ADMIN — Medication 100 MG: at 12:24

## 2023-10-26 RX ADMIN — ONDANSETRON 4 MG: 2 INJECTION INTRAMUSCULAR; INTRAVENOUS at 14:18

## 2023-10-26 RX ADMIN — PROPOFOL 50 MG: 10 INJECTION, EMULSION INTRAVENOUS at 12:29

## 2023-10-26 RX ADMIN — HYDROMORPHONE HYDROCHLORIDE 0.5 MG: 1 INJECTION, SOLUTION INTRAMUSCULAR; INTRAVENOUS; SUBCUTANEOUS at 15:30

## 2023-10-26 RX ADMIN — HYDROMORPHONE HYDROCHLORIDE 0.5 MG: 1 INJECTION, SOLUTION INTRAMUSCULAR; INTRAVENOUS; SUBCUTANEOUS at 15:53

## 2023-10-26 RX ADMIN — Medication 200 MCG: at 12:40

## 2023-10-26 RX ADMIN — PROPOFOL 50 MG: 10 INJECTION, EMULSION INTRAVENOUS at 13:48

## 2023-10-26 RX ADMIN — PROPOFOL 150 MG: 10 INJECTION, EMULSION INTRAVENOUS at 12:24

## 2023-10-26 RX ADMIN — ENOXAPARIN SODIUM 40 MG: 40 INJECTION SUBCUTANEOUS at 18:15

## 2023-10-26 RX ADMIN — GLYCOPYRROLATE 0.2 MG: 0.2 INJECTION, SOLUTION INTRAMUSCULAR; INTRAVENOUS at 13:16

## 2023-10-26 RX ADMIN — ONDANSETRON 4 MG: 2 INJECTION INTRAMUSCULAR; INTRAVENOUS at 15:37

## 2023-10-26 SDOH — SOCIAL STABILITY: SOCIAL INSECURITY: ARE YOU OR HAVE YOU BEEN THREATENED OR ABUSED PHYSICALLY, EMOTIONALLY, OR SEXUALLY BY ANYONE?: NO

## 2023-10-26 SDOH — SOCIAL STABILITY: SOCIAL INSECURITY: WERE YOU ABLE TO COMPLETE ALL THE BEHAVIORAL HEALTH SCREENINGS?: YES

## 2023-10-26 SDOH — SOCIAL STABILITY: SOCIAL INSECURITY: DO YOU FEEL ANYONE HAS EXPLOITED OR TAKEN ADVANTAGE OF YOU FINANCIALLY OR OF YOUR PERSONAL PROPERTY?: NO

## 2023-10-26 SDOH — SOCIAL STABILITY: SOCIAL INSECURITY: HAVE YOU HAD THOUGHTS OF HARMING ANYONE ELSE?: NO

## 2023-10-26 SDOH — SOCIAL STABILITY: SOCIAL INSECURITY: DO YOU FEEL UNSAFE GOING BACK TO THE PLACE WHERE YOU ARE LIVING?: NO

## 2023-10-26 SDOH — SOCIAL STABILITY: SOCIAL INSECURITY: ARE THERE ANY APPARENT SIGNS OF INJURIES/BEHAVIORS THAT COULD BE RELATED TO ABUSE/NEGLECT?: NO

## 2023-10-26 SDOH — HEALTH STABILITY: MENTAL HEALTH: CURRENT SMOKER: 1

## 2023-10-26 SDOH — SOCIAL STABILITY: SOCIAL INSECURITY: ABUSE: ADULT

## 2023-10-26 SDOH — SOCIAL STABILITY: SOCIAL INSECURITY: HAS ANYONE EVER THREATENED TO HURT YOUR FAMILY OR YOUR PETS?: NO

## 2023-10-26 SDOH — SOCIAL STABILITY: SOCIAL INSECURITY: DOES ANYONE TRY TO KEEP YOU FROM HAVING/CONTACTING OTHER FRIENDS OR DOING THINGS OUTSIDE YOUR HOME?: NO

## 2023-10-26 ASSESSMENT — LIFESTYLE VARIABLES
HOW OFTEN DO YOU HAVE 6 OR MORE DRINKS ON ONE OCCASION: NEVER
HOW OFTEN DO YOU HAVE A DRINK CONTAINING ALCOHOL: MONTHLY OR LESS
PRESCIPTION_ABUSE_PAST_12_MONTHS: NO
SKIP TO QUESTIONS 9-10: 1
AUDIT-C TOTAL SCORE: 1
AUDIT-C TOTAL SCORE: 1
SUBSTANCE_ABUSE_PAST_12_MONTHS: NO
HOW MANY STANDARD DRINKS CONTAINING ALCOHOL DO YOU HAVE ON A TYPICAL DAY: 1 OR 2

## 2023-10-26 ASSESSMENT — PATIENT HEALTH QUESTIONNAIRE - PHQ9
1. LITTLE INTEREST OR PLEASURE IN DOING THINGS: NOT AT ALL
2. FEELING DOWN, DEPRESSED OR HOPELESS: NOT AT ALL
SUM OF ALL RESPONSES TO PHQ9 QUESTIONS 1 & 2: 0

## 2023-10-26 ASSESSMENT — COGNITIVE AND FUNCTIONAL STATUS - GENERAL
DAILY ACTIVITIY SCORE: 24
PATIENT BASELINE BEDBOUND: NO
MOBILITY SCORE: 24

## 2023-10-26 ASSESSMENT — PAIN - FUNCTIONAL ASSESSMENT
PAIN_FUNCTIONAL_ASSESSMENT: 0-10

## 2023-10-26 ASSESSMENT — ACTIVITIES OF DAILY LIVING (ADL)
BATHING: INDEPENDENT
PATIENT'S MEMORY ADEQUATE TO SAFELY COMPLETE DAILY ACTIVITIES?: YES
FEEDING YOURSELF: INDEPENDENT
HEARING - LEFT EAR: FUNCTIONAL
HEARING - RIGHT EAR: FUNCTIONAL
WALKS IN HOME: INDEPENDENT
LACK_OF_TRANSPORTATION: NO
ADEQUATE_TO_COMPLETE_ADL: YES
TOILETING: INDEPENDENT
GROOMING: INDEPENDENT
JUDGMENT_ADEQUATE_SAFELY_COMPLETE_DAILY_ACTIVITIES: YES
DRESSING YOURSELF: INDEPENDENT

## 2023-10-26 ASSESSMENT — PAIN DESCRIPTION - DESCRIPTORS
DESCRIPTORS: DISCOMFORT

## 2023-10-26 ASSESSMENT — COLUMBIA-SUICIDE SEVERITY RATING SCALE - C-SSRS
2. HAVE YOU ACTUALLY HAD ANY THOUGHTS OF KILLING YOURSELF?: NO
6. HAVE YOU EVER DONE ANYTHING, STARTED TO DO ANYTHING, OR PREPARED TO DO ANYTHING TO END YOUR LIFE?: NO
1. IN THE PAST MONTH, HAVE YOU WISHED YOU WERE DEAD OR WISHED YOU COULD GO TO SLEEP AND NOT WAKE UP?: NO

## 2023-10-26 ASSESSMENT — PAIN SCALES - GENERAL
PAINLEVEL_OUTOF10: 0 - NO PAIN
PAINLEVEL_OUTOF10: 0 - NO PAIN
PAINLEVEL_OUTOF10: 4
PAINLEVEL_OUTOF10: 0 - NO PAIN
PAINLEVEL_OUTOF10: 7
PAINLEVEL_OUTOF10: 0 - NO PAIN
PAINLEVEL_OUTOF10: 6
PAINLEVEL_OUTOF10: 0 - NO PAIN
PAINLEVEL_OUTOF10: 3

## 2023-10-26 NOTE — ANESTHESIA POSTPROCEDURE EVALUATION
Patient: Jaki Sarah    Procedure Summary       Date: 10/26/23 Room / Location: [facility] OR 04 / Virtual [facility] OR    Anesthesia Start: 1219 Anesthesia Stop: 1449    Procedure: TOTAL THYROIDECTOMY W/NERVE MONITOR (Neck) Diagnosis:       Nontoxic single thyroid nodule      (Nontoxic single thyroid nodule)    Surgeons: Quang Mobley MD Responsible Provider: Enmanuel Figueroa MD    Anesthesia Type: general ASA Status: 2            Anesthesia Type: general    Vitals Value Taken Time   /78 10/26/23 1615   Temp 36 °C (96.8 °F) 10/26/23 1615   Pulse 64 10/26/23 1630   Resp 12 10/26/23 1630   SpO2 97 % 10/26/23 1630       Anesthesia Post Evaluation    Patient location during evaluation: PACU  Patient participation: complete - patient participated  Level of consciousness: awake and alert  Pain management: satisfactory to patient  There was medical reason for not using a multimodal analgesia pain management approach.Airway patency: patent  There was medical reason for not screening for obstructive sleep apnea and/or not using of two or more mitigation strategies.Cardiovascular status: acceptable  Respiratory status: acceptable  Hydration status: euvolemic        No notable events documented.

## 2023-10-26 NOTE — ANESTHESIA PROCEDURE NOTES
Airway  Date/Time: 10/26/2023 12:25 PM  Urgency: elective    Airway not difficult    Staffing  Performed: [facility] and [facility]   Authorized by: Enmanuel Figueroa MD    Performed by: JARRELL Leon  Patient location during procedure: OR    Indications and Patient Condition  Indications for airway management: anesthesia and airway protection  Spontaneous ventilation: present  Sedation level: deep  Preoxygenated: yes  Patient position: sniffing  Mask difficulty assessment: 1 - vent by mask  Planned trial extubation    Final Airway Details  Final airway type: endotracheal airway      Successful airway: NIM tube  Cuffed: yes   Successful intubation technique: direct laryngoscopy  Facilitating devices/methods: intubating stylet  Endotracheal tube insertion site: oral  Blade type: glidescope.  Blade size: #3  Cormack-Lehane Classification: grade I - full view of glottis  Placement verified by: chest auscultation and capnometry   Measured from: gums  ETT to gums (cm): 22  Number of attempts at approach: 1    Additional Comments  atraumatic

## 2023-10-26 NOTE — DISCHARGE INSTRUCTIONS
[facility] DEPARTMENT OF OTOLARYNGOLOGY (ENT):  THYROIDECTOMY/PARATHYROIDECTOMY POST-OPERATIVE INSTRUCTIONS    Operations performed while hospitalized:   Thyroidectomy    Treatment/wound care:   Keep area(s) clean and dry.   It is okay to shower 48 hours after time of surgery.    Do not scrub wound(s), pat dry.    Do not submerge wound(s) in standing water until seen in followup (no tub bathing, swimming, or hot tubs).    Please visually inspect your wound(s) at least once daily.  If the wound(s) are in a difficult to see location, please use a mirror or have someone else assist with visual inspection.call if wound(s) or surrounding area have increased swelling, pain, warmth, redness, or drainage that is thick, yellow and/or green.  You have a drain in place that will remain in until Monday to prevent fluid from building up under the skin.  Remove cap and empty the drain 1-2 times daily.  Record output.  Squeeze the bulb and replace the cap to reactivate drain    If you see white bandages on your neck:  You have steri strips (small paper tape) along your incision. You can shower, pat dry; do not soak in a tub.  The steri strips will fall off on their own; do not peel them off.    Expected Post-Surgical Symptoms:  You may experience neck pain and a hoarse/weak voice. These symptoms are often temporary and may be due to irritation from the breathing tube that's inserted during surgery.  Swallowing difficulties due to pain for several days.    Pain Control:    Adequate management:   Tylenol, motrin, and tramadol can be taken as prescribed as needed for breakthrough pain.      Activity after Discharge:   When you go home, you can usually return to your regular activities.  Avoid strenuous activities, such as bicycle riding, jogging, weight lifting, or aerobic exercise, for at least 2 weeks.   No travelling for at least 7 days following surgery.  Do not drive or operate heavy machinery while taking narcotic  pain medications as these medications can alter perception, impair judgement, and slow reaction times.    Diet: resume normal diet    Additional Instructions:   Medicines    Take pain medicines exactly as directed.     If you have a total thyroidectomy your body will no longer be able to make thyroid hormone, and without a replacement you'll develop signs and symptoms of an underactive thyroid (hypothyroidism). Therefore, you will need to take a pill every day that contains the synthetic thyroid hormone levothyroxine.     Take your levothyroxine/Synthroid 30 minutes before food or coffee.  Wait 30 minutes after taking levothyroxine to take Proton Pump Inhibitors, examples of these medications are Protonix, Prilosec, Nexium, and others.    Wait two hours after taking levothyroxine to take vitamins calcium iron.   If you forget to take your levothyroxine, double the dose the next day.    You may also have a prescription for calcium supplementation, it is important to take this exactly as instructed.    Signs & Symptoms of Low Calcium:  Tingling in your hands, feet, or lips   Muscle spasms, weakness, shaking, or loss of body control   Seizures   Slow or uneven heartbeat, lightheadedness   Anxiety, depression, anger, confusion, or seeing or hearing things that are not really there     If you experience any of these symptoms please take 2 extra strength Tums and contact your surgeon's office for further instructions.

## 2023-10-26 NOTE — OP NOTE
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]     Procedures  TOTAL THYROIDECTOMY W/NERVE MONITOR  88045 - MT THYROIDECTOMY TOTAL/COMPLETE    Direct laryngoscopy     Surgeons      * Quang Mobley - Primary    Resident/Fellow/Other Assistant:  Surgeon(s) and Role: Luh Pereyra MD     Procedure Summary  Anesthesia: General  ASA: II  Anesthesia Staff: Anesthesiologist: Enmanuel Figueroa MD  C-AA: JARRELL Leon  Estimated Blood Loss: 40 mL  Intra-op Medications:   Medication Name Total Dose   lidocaine-epinephrine (Xylocaine W/EPI) 1 %-1:100,000 injection 10 mL       Specimen:   ID Type Source Tests Collected by Time   1 : TOTAL THYROID; STITCH AT RIGHT SUPERIOR POLE; Tissue THYROID TOTAL THYROIDECTOMY SURGICAL PATHOLOGY EXAM Quang Mobley MD 10/26/2023 1404        Staff:   Circulator: ALIRIO Sandra-CNP  Relief Circulator: Yu Mcrae RN  Scrub Person: Marlys Luna; Sri Christensen     Drains and/or Catheters: ILIA in right neck    Findings: Large anterior goiter of the right thyroid lobe, total thyroidectomy completed with identification and preservation of the RLN  R - identified and stimulated at 0.5 mV in joint space  L - did not dissect, CN X identified in carotid sheath and directly stimulated at 0.5 mV with robust response     Indications: Jaki Sarah is an 52 y.o. female who is having surgery for large anterior goiter. She was seen in clinic for evaluation of a long standing goiter that as now been enlarging. The prior FNA that were completed were benign therefore she elected to monitor however it is now symptomatic. We discussed risks, benefits, complications, and  treatment options, non-operative alternatives, expected  recovery and outcomes were discussed with the patient. The possibilities of reaction to medication, pulmonary aspiration, injury to surrounding structures, bleeding, recurrent infection, the need for additional procedures, failure to diagnose a condition, and creating a complication requiring transfusion or operation were discussed with the patient. The patient concurred with the proposed plan, giving informed consent.  The site of surgery was properly noted/marked if necessary per policy. The patient has been actively warmed in preoperative area. Preoperative antibiotics have been ordered and given within 1 hours of incision. Venous thrombosis prophylaxis have been ordered including bilateral sequential compression devices    Procedure Details:     The patient then underwent anesthesia induction and was intubated with the NIMS tube and the monitor was connected. A low midline neck creased was marked and injected with 1% 1:100,000 lidocaine with epinephrine. While this took effect, we performed direct laryngoscopy to assess the vocal folds given concerns for a small lesion noted on in office laryngoscopy. The dedo laryngoscope was advanced through the oral cavity and all aspects of the upper aerodigestive tract were evaluated including the vocal cords and no abnormal lesions were noted. We removed the instrumentation and then proceeded to prepped and drape in a sterile fashion.     A  large 9 cm incision low in the midline neck was outlined, injected, and then prepped and draped in appropriate fashion, a large incision was used due to extent of the anterior goiter.  A #15 blade was used to incise the skin.  Electrocautery was used to incise down to the anterior jugular veins.  Subplatysmal flaps were elevated superiorly to the superior extent of the goiter and inferiorly to the clavicle. The anterior jugular veins were ligated superiorly and inferiorly and excised. The strap muscles were identified and  and  elevated off the thyroid. They were divided bilaterally for access. We started by freeing the right thyroid/goiter from the surrounding muscle and fascia. We following the boundaries of the mass circumferentially. We found the trachea inferiorly and superiorly.   Next, we systematically identified, isolated, clipped, and ligated the superior pole, lateral extent, and inferior pole mobilizing the right thyroid. We entered Joll's space and mobilized the superior pole further.  It was retracted down and all the soft tissue down to the joint space was freed.  The fascia was swept off the gland circumferentially. The trachea had been identified. Next we retracted the goiter medially, and tubercle of Zuckerkandl was identified, it was mobilized up off the recurrent nerve which was encountered at this point, heading towards the joint space. All the vessels lateral and medial to the recurrent nerve were isolated, clipped, and cut.  Dougherty's ligament was released and the isthmus was elevated off the trachea.     The same dissection was completed on the contralateral side.  The strap muscles were  and elevated off the remaining thyroid, and retractors were placed.  The left thyroid was normal in size. The superior pole was identified, isolated, clipped, and cut.  A Lakia was placed on the superior pole being careful not to rupture any nodules/masses, as we entered Joll's space and mobilized the superior pole further.  It was retracted down and all the soft tissue down to the joint space was freed. We then identified the lateral aspect of the gland and ligated the middle thyroid vessels.  The inferior pole vessels were isolated, clipped, and cut.  The fascia was swept off the gland. The inferior pole was mobilized off the trachea and the tubercle of Zuckerkandl was identified and the gland was mobilized off of the trachea and the remainder of the attachments were freed from the trachea.    The specimen was oriented  and sent for pathology. We then identified the left CN X in the carotid sheath and stimulated the vagus nerve at 0.5 mV with robust response on the monitor.     This concluded our procedure. Extensive irrigation and hemostasis was ensured, valsalva was performed. A small amount of fibrillar was placed. A 10 flat drain was placed into the resection cavity through the right neck and secured with a 3-0 vicryl. The straps were re-approximated with a 3-0 vicryl. The platysma and deep dermal  was reapproximated with a 3-0 vicryl and a 4-0 monocryl was used for skin closure. She was extubated at the end of the case in good condition.    Complications:  None; patient tolerated the procedure well.    Disposition: PACU - hemodynamically stable.  Condition: stable     Attending Attestation: I was present and scrubbed for the entire procedure.    Quang Mobley  Phone Number: [phone]

## 2023-10-26 NOTE — NURSING NOTE
Patient arrived to unit, complains of moderate headache, given 975mg oral tylenol as instructed by PACU nurse.  The patient is able to drink water and swallow pills without any difficulty. Patient educated on fall safety and how to use call light. Awaiting family members to bring patients belongings.

## 2023-10-26 NOTE — ANESTHESIA PREPROCEDURE EVALUATION
Patient: Jaki Sarah    Procedure Information       Date/Time: 10/26/23 1215    Procedure: TOTAL THYROIDECTOMY W/NERVE MONITOR    Location: [facility] OR 04 / Virtual [facility]    Surgeons: Quang Mobley MD            Relevant Problems   Endocrine   (+) Goiter      Pulmonary   (+) Asthma       Clinical information reviewed:   Tobacco  Allergies  Meds   Med Hx  Surg Hx  OB Status  Fam Hx  Soc   Hx        NPO Detail:  NPO/Void Status  Carbonhydrate Drink Given Prior to Surgery? : N  Date of Last Liquid: 10/25/23  Time of Last Liquid: 2300  Date of Last Solid: 10/25/23  Time of Last Solid: 2100  Last Intake Type: Clear fluids  Time of Last Void: 1100         Physical Exam    Airway  Mallampati: II  TM distance: >3 FB  Neck ROM: full     Cardiovascular   Rhythm: regular  Rate: normal     Dental - normal exam     Pulmonary   Breath sounds clear to auscultation     Abdominal            Anesthesia Plan    ASA 2     general     The patient is a current smoker.  Patient was previously instructed to abstain from smoking on day of procedure.  Patient did not smoke on day of procedure.    intravenous induction   Postoperative administration of opioids is intended.  Anesthetic plan and risks discussed with patient.    Plan discussed with CAA.

## 2023-10-27 VITALS
OXYGEN SATURATION: 98 % | DIASTOLIC BLOOD PRESSURE: 52 MMHG | HEIGHT: 65 IN | RESPIRATION RATE: 18 BRPM | HEART RATE: 69 BPM | SYSTOLIC BLOOD PRESSURE: 99 MMHG | WEIGHT: 155 LBS | TEMPERATURE: 97.9 F | BODY MASS INDEX: 25.83 KG/M2

## 2023-10-27 PROBLEM — E04.1 NONTOXIC SINGLE THYROID NODULE: Status: ACTIVE | Noted: 2023-10-27

## 2023-10-27 PROBLEM — E04.9 GOITER: Status: RESOLVED | Noted: 2023-10-26 | Resolved: 2023-10-27

## 2023-10-27 PROCEDURE — G0378 HOSPITAL OBSERVATION PER HR: HCPCS

## 2023-10-27 PROCEDURE — 2500000001 HC RX 250 WO HCPCS SELF ADMINISTERED DRUGS (ALT 637 FOR MEDICARE OP): Performed by: STUDENT IN AN ORGANIZED HEALTH CARE EDUCATION/TRAINING PROGRAM

## 2023-10-27 RX ORDER — TRAMADOL HYDROCHLORIDE 50 MG/1
50 TABLET ORAL EVERY 6 HOURS PRN
Qty: 12 TABLET | Refills: 0 | Status: SHIPPED | OUTPATIENT
Start: 2023-10-27

## 2023-10-27 RX ORDER — LEVOTHYROXINE SODIUM 112 UG/1
112 TABLET ORAL
Qty: 30 TABLET | Refills: 0 | Status: SHIPPED | OUTPATIENT
Start: 2023-10-28 | End: 2023-11-20 | Stop reason: SDUPTHER

## 2023-10-27 RX ADMIN — TRAMADOL HYDROCHLORIDE 50 MG: 50 TABLET, COATED ORAL at 12:37

## 2023-10-27 RX ADMIN — ACETAMINOPHEN 975 MG: 325 TABLET ORAL at 06:13

## 2023-10-27 RX ADMIN — TRAMADOL HYDROCHLORIDE 50 MG: 50 TABLET, COATED ORAL at 06:17

## 2023-10-27 RX ADMIN — LEVOTHYROXINE SODIUM 112 MCG: 0.11 TABLET ORAL at 06:13

## 2023-10-27 RX ADMIN — ACETAMINOPHEN 975 MG: 325 TABLET ORAL at 00:14

## 2023-10-27 ASSESSMENT — PAIN - FUNCTIONAL ASSESSMENT
PAIN_FUNCTIONAL_ASSESSMENT: 0-10
PAIN_FUNCTIONAL_ASSESSMENT: 0-10

## 2023-10-27 ASSESSMENT — PAIN DESCRIPTION - DESCRIPTORS
DESCRIPTORS: ACHING;BURNING
DESCRIPTORS: ACHING

## 2023-10-27 ASSESSMENT — PAIN SCALES - GENERAL
PAINLEVEL_OUTOF10: 4
PAINLEVEL_OUTOF10: 1
PAINLEVEL_OUTOF10: 6

## 2023-10-27 NOTE — PROGRESS NOTES
ENT DAILY PROGRESS NOTE  Name: Jaki Sarah  MRN: [id number]  : 1970    Identification Statement  The patient is a 52 y.o. female who underwent total thyroidectomy for multinodular goiter.  Did well overnight.  Postoperative PTH normal.  Minimal pain    Subjective  No acute events overnight  No concerns reported by nursing  Pain minimal  Tolerating regular diet  Drain output serosanguineous and decreasing.  Including PACU output has brought out approximately 100 since time of surgery      Objective  Temp:  [35.8 °C (96.4 °F)-36.9 °C (98.4 °F)] 36.9 °C (98.4 °F)  Heart Rate:  [62-88] 69  Resp:  [10-18] 18  BP: ()/(52-90) 108/54  Gen: Alert, oriented, no acute distress  Resp: Breathing comfortably on room air, no stridor  Head: Atraumatic, normocephalic  Neck: Incision flat, no concern for fluid collection  Drains holding suction        Intake/Output Summary (Last 24 hours) at 10/27/2023 0615  Last data filed at 10/27/2023 0400  Gross per 24 hour   Intake 2684.11 ml   Output 875 ml   Net 1809.11 ml       Labs    Assessment  Jaki Sarah is a 52 y.o. female who underwent total thyroidectomy for multinodular goiter    Active Problems:  Multinodular goiter    Plan:  -DC today with drain in place  -Follow-up Monday for drain removal  -We will give prescription for pain medication and Synthroid

## 2023-10-27 NOTE — PROGRESS NOTES
10/27/23 0905   Discharge Planning   Living Arrangements Spouse/significant other   Support Systems Spouse/significant other   Assistance Needed none   Type of Residence Private residence   Home or Post Acute Services None   Patient expects to be discharged to: Home     Met with patient at bedside. Admitted for thyroid goiter removal. Pt lives with spouse and was independent PTA with no HHC or DME. PT plans to discharge home with surgical drain. Drain to be removed Monday in MD office. No new discharge needs anticipated. Family will provide transport home.

## 2023-10-30 ENCOUNTER — OFFICE VISIT (OUTPATIENT)
Dept: OTOLARYNGOLOGY | Facility: CLINIC | Age: 53
End: 2023-10-30
Payer: COMMERCIAL

## 2023-10-30 VITALS — WEIGHT: 158.4 LBS | BODY MASS INDEX: 26.36 KG/M2

## 2023-10-30 DIAGNOSIS — E04.1 THYROID NODULE: Primary | ICD-10-CM

## 2023-10-30 PROCEDURE — 4004F PT TOBACCO SCREEN RCVD TLK: CPT | Performed by: OTOLARYNGOLOGY

## 2023-10-30 PROCEDURE — 99024 POSTOP FOLLOW-UP VISIT: CPT | Performed by: OTOLARYNGOLOGY

## 2023-10-30 NOTE — PROGRESS NOTES
ENT Follow up Visit    History Of Present Illness  Jaki Sarah is a 52 y.o. female status post total thyroidectomy here for drain removal.  Postoperative PTH was normal.  She is on Synthroid.  No other issues over the weekend     Past Medical History  She has a past medical history of Anxiety, Asthma, Depression, GERD (gastroesophageal reflux disease), Goiter, Joint pain, and Thyroid nodule.    Surgical History  She has a past surgical history that includes Myringotomy w/ tubes and Adenoidectomy.     Social History  She reports that she has been smoking cigarettes. She has been smoking an average of 1 pack per day. She has never used smokeless tobacco. She reports current alcohol use. She reports that she does not use drugs.    Family History  Family History   Problem Relation Name Age of Onset    Diabetes Father      Kidney failure Father      Other (Heart problem) Father          Allergies  Guaifenesin and Sulfamethoxazole-trimethoprim     Physical Exam:  Neck is flat and soft.  Steri-Strips in place.  Drain output serosanguineous.  Drain removed  Assessment and Plan  52 y.o. female status post total thyroidectomy for multinodular goiter.    -Take Synthroid as prescribed  -Discussed incision care  -Follow-up in 2 to 3 weeks for incision check  -Did not visualize anything concerning at time of DL in the operating room.  Consider scope exam again at future visit    Quang Mobley MD

## 2023-11-05 LAB
LABORATORY COMMENT REPORT: NORMAL
PATH REPORT.FINAL DX SPEC: NORMAL
PATH REPORT.GROSS SPEC: NORMAL
PATH REPORT.RELEVANT HX SPEC: NORMAL
PATH REPORT.TOTAL CANCER: NORMAL
PATHOLOGY SYNOPTIC REPORT: NORMAL

## 2023-11-10 ENCOUNTER — OFFICE VISIT (OUTPATIENT)
Dept: OTOLARYNGOLOGY | Facility: CLINIC | Age: 53
End: 2023-11-10
Payer: COMMERCIAL

## 2023-11-10 DIAGNOSIS — C73 MALIGNANT NEOPLASM OF THYROID GLAND (MULTI): Primary | ICD-10-CM

## 2023-11-10 PROCEDURE — 99024 POSTOP FOLLOW-UP VISIT: CPT | Performed by: OTOLARYNGOLOGY

## 2023-11-10 PROCEDURE — 4004F PT TOBACCO SCREEN RCVD TLK: CPT | Performed by: OTOLARYNGOLOGY

## 2023-11-10 NOTE — PROGRESS NOTES
ENT Follow up Visit    History Of Present Illness  Jaki Sarah is a 52 y.o. female status post total thyroidectomy Postoperative PTH was normal.  She is on Synthroid.      11/10/23: Patient returns for follow-up.  Overall doing well.  Still has some soreness around her anterior neck but improving.  Pathology did return as a thyroid cancer     Past Medical History  She has a past medical history of Anxiety, Asthma, Depression, GERD (gastroesophageal reflux disease), Goiter, Joint pain, and Thyroid nodule.    Surgical History  She has a past surgical history that includes Myringotomy w/ tubes and Adenoidectomy.     Social History  She reports that she has been smoking cigarettes. She has been smoking an average of 1 pack per day. She has never used smokeless tobacco. She reports current alcohol use. She reports that she does not use drugs.    Family History  Family History   Problem Relation Name Age of Onset    Diabetes Father      Kidney failure Father      Other (Heart problem) Father          Allergies  Guaifenesin and Sulfamethoxazole-trimethoprim     Physical Exam:  Neck is flat and soft.  Incision well approximated, no sign of infection or fluid collection  Assessment and Plan  52 y.o. female status post total thyroidectomy for multinodular goiter.    -Take Synthroid as prescribed  -Did not visualize anything concerning at time of DL in the operating room.  Consider scope exam again at next visit  -We will refer to endocrinology   -she will follow up in 2-3 months    Quang Mobley MD

## 2023-11-10 NOTE — LETTER
November 10, 2023     Patient: Jaki Sarah   YOB: 1970   Date of Visit: 11/10/2023       To Whom It May Concern:    Jaki Sarah was seen in my clinic on 11/10/2023 at 1:45 pm. Please excuse Jaki for her absence from work on this day to make the appointment. Jaki may return back to work 11/20/23    If you have any questions or concerns, please don't hesitate to call.         Sincerely,         Quang Mobley MD

## 2023-11-20 DIAGNOSIS — E04.1 THYROID NODULE: ICD-10-CM

## 2023-11-20 DIAGNOSIS — E04.9 GOITER: ICD-10-CM

## 2023-11-20 RX ORDER — LEVOTHYROXINE SODIUM 112 UG/1
TABLET ORAL
Qty: 30 TABLET | Refills: 0 | Status: SHIPPED | OUTPATIENT
Start: 2023-11-20 | End: 2024-01-24

## 2023-11-27 ENCOUNTER — OFFICE VISIT (OUTPATIENT)
Dept: ENDOCRINOLOGY | Facility: CLINIC | Age: 53
End: 2023-11-27
Payer: COMMERCIAL

## 2023-11-27 VITALS
BODY MASS INDEX: 26.49 KG/M2 | WEIGHT: 159 LBS | SYSTOLIC BLOOD PRESSURE: 127 MMHG | DIASTOLIC BLOOD PRESSURE: 79 MMHG | HEIGHT: 65 IN

## 2023-11-27 DIAGNOSIS — C73 MALIGNANT NEOPLASM OF THYROID GLAND (MULTI): ICD-10-CM

## 2023-11-27 DIAGNOSIS — E89.0 POSTSURGICAL HYPOTHYROIDISM: ICD-10-CM

## 2023-11-27 DIAGNOSIS — C73 THYROID CANCER (MULTI): Primary | ICD-10-CM

## 2023-11-27 PROCEDURE — 99204 OFFICE O/P NEW MOD 45 MIN: CPT | Performed by: STUDENT IN AN ORGANIZED HEALTH CARE EDUCATION/TRAINING PROGRAM

## 2023-11-27 PROCEDURE — 4004F PT TOBACCO SCREEN RCVD TLK: CPT | Performed by: STUDENT IN AN ORGANIZED HEALTH CARE EDUCATION/TRAINING PROGRAM

## 2023-11-27 ASSESSMENT — ENCOUNTER SYMPTOMS: CONSTITUTIONAL NEGATIVE: 1

## 2023-11-27 NOTE — PROGRESS NOTES
Subjective   Patient ID: Jaki Sarah is a 52 y.o. female who presents for Thyroid Cancer (DX: malignant neoplasm of thyroid gland 2023 with full thyroidectomy/Family HX: Father Aunt niece/Current dose levothyroxine 112mcg/).  HPI  The patient is a 52-year-old female with history of total thyroidectomy on 10/26/2023 by Dr. 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  She states that she had a goiter all of her adult life and was following with different providers over the years however more recently, she had compressive symptoms/SOB especially when trying to sleep.      Review of Systems   Constitutional: Negative.        Objective   Physical Exam  Constitutional:       Appearance: Normal appearance.   Neck:      Thyroid: No thyroid mass or thyromegaly.      Comments: Tender neck   Cardiovascular:      Rate and Rhythm: Normal rate and regular rhythm.   Lymphadenopathy:      Cervical: No cervical adenopathy.   Neurological:      Mental Status: She is alert.         Assessment/Plan        The patient is a 52-year-old female with history of total thyroidectomy on 10/26/2023 by Dr. 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  We discussed given the size of the thyroid cancer radioactive iodine would be recommended.  We discussed hormone withdrawal versus Thyrogen she thinks her insurance will not cover Thyrogen.  We discussed low iodine diet reading material given.  She wants to hold off until after the holidays    Plan  Start low iodine diet January 1 ,  also stop  Levothyroxine on same day   TFT and TG  on January 15 th   Virtual Appointment January 17

## 2023-11-27 NOTE — PROGRESS NOTES
"Subjective   Jaki Sarah is a 52 y.o. female who I was asked to see in consultation for evaluation {l/r/bi:99335} neck {mass type:81589}. The lesion was noted {0-10:12996} {units:11} ago. It {Has/Has Not:8647123583} increased in size since that time. Patient currently has symptoms of {nodule sx:51999}, that have been {course:51519}. Symptoms have been present for { 0-10:16968} {units:11}. Patient denies {nodule sx:61298}. There is a family history of {fam hx:32917}. There {is/is not:55680} a history of radiation to the neck, head or face. Previous work up has been ***.    Review of Systems    Objective   Physical Exam    Lab Review  Lab Results   Component Value Date    TSH 1.37 08/31/2023     No results found for: \"FREET4\"    Assessment/Plan   Diagnoses and all orders for this visit:  Malignant neoplasm of thyroid gland (CMS/HCC)  -     Referral to Endocrinology      {nodule or goiter:66683}.  The implications of this diagnosis were discussed the patient.  {Thyroid nodule orders:87506}  The risks and benefits of my recommendations, as well as other treatment options were discussed with the {:5061::\"patient\"} today. Questions were answered.  Follow up: {0-10:59221} {time units:11} and as needed.  "

## 2024-01-12 ENCOUNTER — OFFICE VISIT (OUTPATIENT)
Dept: OTOLARYNGOLOGY | Facility: CLINIC | Age: 54
End: 2024-01-12
Payer: COMMERCIAL

## 2024-01-12 VITALS — WEIGHT: 154.3 LBS | BODY MASS INDEX: 25.68 KG/M2

## 2024-01-12 DIAGNOSIS — C73 MALIGNANT NEOPLASM OF THYROID GLAND (MULTI): Primary | ICD-10-CM

## 2024-01-12 PROCEDURE — 99024 POSTOP FOLLOW-UP VISIT: CPT | Performed by: OTOLARYNGOLOGY

## 2024-01-12 ASSESSMENT — PATIENT HEALTH QUESTIONNAIRE - PHQ9
1. LITTLE INTEREST OR PLEASURE IN DOING THINGS: NOT AT ALL
SUM OF ALL RESPONSES TO PHQ9 QUESTIONS 1 AND 2: 0
2. FEELING DOWN, DEPRESSED OR HOPELESS: NOT AT ALL

## 2024-01-12 NOTE — PROGRESS NOTES
ENT Follow up Visit    History Of Present Illness  Jaki Sarah is a 53 y.o. female status post total thyroidectomy Postoperative PTH was normal.  She is on Synthroid.      11/10/23: Patient returns for follow-up.  Overall doing well.  Still has some soreness around her anterior neck but improving.  Pathology did return as a thyroid cancer    1/12/24: Patient returns for follow-up.  She is in the process of planning her radioactive iodine.  She has not had issues with voice or swallowing.  She has had some persistent tenderness on the right neck over the SCM.  She does think it has slightly improved     Past Medical History  She has a past medical history of Anxiety, Asthma, Depression, GERD (gastroesophageal reflux disease), Goiter, Joint pain, and Thyroid nodule.    Surgical History  She has a past surgical history that includes Myringotomy w/ tubes and Adenoidectomy.     Social History  She reports that she has been smoking cigarettes. She has been smoking an average of 1 pack per day. She has never used smokeless tobacco. She reports current alcohol use. She reports that she does not use drugs.    Family History  Family History   Problem Relation Name Age of Onset    Diabetes Father      Kidney failure Father      Other (Heart problem) Father          Allergies  Guaifenesin and Sulfamethoxazole-trimethoprim     Physical Exam:  CONSTITUTIONAL: Vitals -reviewed from intake field, well developed, well nourished.   VOICE: hoarse voice, no breathiness   RESPIRATION: Breathing comfortably, no stridor.   CV: No clubbing/cyanosis/edema in hands.   EYES: EOM Intact, sclera normal.   NEURO: Alert and oriented times 3, Cranial nerves II-XII intact and symmetric bilaterally.   HEAD AND FACE: Symmetric facial features, no masses or lesions, sinuses nontender to palpation.   SALIVARY GLANDS: Parotid and submandibular glands normal bilaterally.   EARS: Normal external ears, external auditory canals, and TMs to otoscopy,  normal hearing to whispered voice.   NOSE: External nose midline, anterior rhinoscopy is normal with limited visualization to the anterior aspect of the interior turbinates. No lesions noted.   ORAL CAVITY/OROPHARYNX/LIPS: Normal mucous membranes, normal floor of mouth/tongue/OP, no masses or lesions are noted.   PHARYNGEAL WALLS AND NASOPHARYNX: No masses noted. Mucosa appears clean and moist   NECK/LYMPH: Flat and soft.  Incision well-healed  SKIN: Neck skin is without scar or injury   PSYCH: Alert and oriented with appropriate mood and affect   Assessment and Plan  53 y.o. female status post total thyroidectomy for multinodular goiter.    -Follow-up with Dr. Sierra as prescribed  -Did not visualize anything concerning at time of DL in the operating room.  Consider scope exam again at next visit  -she will follow up in ~ 6 months, she will get new ultrasound prior    Quang Mobley MD

## 2024-01-15 ENCOUNTER — LAB (OUTPATIENT)
Dept: LAB | Facility: LAB | Age: 54
End: 2024-01-15
Payer: COMMERCIAL

## 2024-01-15 DIAGNOSIS — C73 MALIGNANT NEOPLASM OF THYROID GLAND (MULTI): ICD-10-CM

## 2024-01-15 LAB
T4 FREE SERPL-MCNC: 0.36 NG/DL (ref 0.61–1.12)
TSH SERPL-ACNC: 31.65 MIU/L (ref 0.44–3.98)

## 2024-01-15 PROCEDURE — 36415 COLL VENOUS BLD VENIPUNCTURE: CPT

## 2024-01-15 PROCEDURE — 84439 ASSAY OF FREE THYROXINE: CPT

## 2024-01-15 PROCEDURE — 84443 ASSAY THYROID STIM HORMONE: CPT

## 2024-01-15 PROCEDURE — 84432 ASSAY OF THYROGLOBULIN: CPT

## 2024-01-15 PROCEDURE — 86800 THYROGLOBULIN ANTIBODY: CPT

## 2024-01-17 ENCOUNTER — TELEMEDICINE (OUTPATIENT)
Dept: ENDOCRINOLOGY | Facility: CLINIC | Age: 54
End: 2024-01-17
Payer: COMMERCIAL

## 2024-01-17 VITALS — WEIGHT: 154 LBS | BODY MASS INDEX: 25.66 KG/M2 | HEIGHT: 65 IN

## 2024-01-17 DIAGNOSIS — C73 MALIGNANT NEOPLASM OF THYROID GLAND (MULTI): Primary | ICD-10-CM

## 2024-01-17 LAB
BILL ONLY-THYROGLOBULIN: NORMAL
THYROGLOB AB SERPL-ACNC: <0.9 IU/ML (ref 0–4)
THYROGLOB SERPL-MCNC: 2.4 NG/ML (ref 1.3–31.8)
THYROGLOB SERPL-MCNC: NORMAL NG/ML (ref 1.3–31.8)

## 2024-01-17 PROCEDURE — 99214 OFFICE O/P EST MOD 30 MIN: CPT | Performed by: STUDENT IN AN ORGANIZED HEALTH CARE EDUCATION/TRAINING PROGRAM

## 2024-01-17 NOTE — PROGRESS NOTES
"Subjective   Patient ID: Jaki Sarah is a 53 y.o. female who presents for Thyroid Cancer (PCP: does not have one, aware to get one /total thyroidectomy on 10/26/2023/Start low iodine diet January 1 ,  also stop  Levothyroxine on same day and also stopped vitamins /).  HPI  The patient is a 53-year-old female with history of total thyroidectomy on 10/26/2023 by Dr. 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    History :  She states that she had a goiter all of her adult life and was following with different providers over the years however more recently, she had compressive symptoms/SOB especially when trying to sleep  Review of Systems    Objective   Physical Exam Visit Vitals  Ht 1.651 m (5' 5\")   Wt 69.9 kg (154 lb)   BMI 25.63 kg/m²   OB Status Perimenopausal   Smoking Status Every Day   BSA 1.79 m²        Assessment/Plan        The patient is a 52-year-old female with history of total thyroidectomy on 10/26/2023 by Dr. 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  She is fully withdrawn off LT3 , TSH 31 , TG 2.4  NJ I 131 100 mCi ordered   Pregnancy test  WBS 5-7 days per protocol   We discussed resuming lt4 and normal diet 24 hours after treatment.    Further recommendations pending above                  "

## 2024-01-24 DIAGNOSIS — E04.1 THYROID NODULE: ICD-10-CM

## 2024-01-24 DIAGNOSIS — E04.9 GOITER: ICD-10-CM

## 2024-01-24 RX ORDER — LEVOTHYROXINE SODIUM 112 UG/1
TABLET ORAL
Qty: 30 TABLET | Refills: 0 | Status: SHIPPED | OUTPATIENT
Start: 2024-01-24 | End: 2024-03-05 | Stop reason: SDUPTHER

## 2024-01-25 ENCOUNTER — HOSPITAL ENCOUNTER (OUTPATIENT)
Dept: RADIOLOGY | Facility: HOSPITAL | Age: 54
Discharge: HOME | End: 2024-01-25
Payer: COMMERCIAL

## 2024-01-25 ENCOUNTER — LAB (OUTPATIENT)
Dept: LAB | Facility: LAB | Age: 54
End: 2024-01-25
Payer: COMMERCIAL

## 2024-01-25 DIAGNOSIS — C73 MALIGNANT NEOPLASM OF THYROID GLAND (MULTI): ICD-10-CM

## 2024-01-25 LAB — HCG UR QL IA.RAPID: NEGATIVE

## 2024-01-25 PROCEDURE — 79005 NUCLEAR RX ORAL ADMIN: CPT | Performed by: STUDENT IN AN ORGANIZED HEALTH CARE EDUCATION/TRAINING PROGRAM

## 2024-01-25 PROCEDURE — 78018 THYROID MET IMAGING BODY: CPT | Mod: 59

## 2024-01-25 PROCEDURE — 3430000001 HC RX 343 DIAGNOSTIC RADIOPHARMACEUTICALS: Performed by: STUDENT IN AN ORGANIZED HEALTH CARE EDUCATION/TRAINING PROGRAM

## 2024-01-25 PROCEDURE — 81025 URINE PREGNANCY TEST: CPT

## 2024-01-25 PROCEDURE — 79005 NUCLEAR RX ORAL ADMIN: CPT

## 2024-01-25 PROCEDURE — A9517 I131 IODIDE CAP, RX: HCPCS | Performed by: STUDENT IN AN ORGANIZED HEALTH CARE EDUCATION/TRAINING PROGRAM

## 2024-01-25 RX ORDER — SODIUM IODIDE I 131 100 MCI/1
104.1 CAPSULE ORAL
Status: COMPLETED | OUTPATIENT
Start: 2024-01-25 | End: 2024-01-25

## 2024-01-25 RX ADMIN — SODIUM IODIDE I 131 104.1 MILLICURIE: 100 CAPSULE ORAL at 16:40

## 2024-01-30 ENCOUNTER — HOSPITAL ENCOUNTER (OUTPATIENT)
Dept: RADIOLOGY | Facility: HOSPITAL | Age: 54
Discharge: HOME | End: 2024-01-30
Payer: COMMERCIAL

## 2024-01-30 DIAGNOSIS — C73 MALIGNANT NEOPLASM OF THYROID GLAND (MULTI): ICD-10-CM

## 2024-01-30 PROCEDURE — 78018 THYROID MET IMAGING BODY: CPT

## 2024-02-05 ENCOUNTER — TELEMEDICINE (OUTPATIENT)
Dept: ENDOCRINOLOGY | Facility: CLINIC | Age: 54
End: 2024-02-05
Payer: COMMERCIAL

## 2024-02-05 DIAGNOSIS — C73 THYROID CANCER (MULTI): ICD-10-CM

## 2024-02-05 DIAGNOSIS — E89.0 POSTSURGICAL HYPOTHYROIDISM: Primary | ICD-10-CM

## 2024-02-05 PROCEDURE — 99214 OFFICE O/P EST MOD 30 MIN: CPT | Performed by: STUDENT IN AN ORGANIZED HEALTH CARE EDUCATION/TRAINING PROGRAM

## 2024-02-05 NOTE — PROGRESS NOTES
Subjective   Patient ID: Jaki Sarah is a 53 y.o. female who presents for Thyroid Cancer (Telephone visit to discuss scan results /PCP: does not have one, aware to get one /total thyroidectomy on 10/26/2023).  HPI    Review of Systems    Objective   Physical Exam no vitals due to telephone visit     Assessment/Plan

## 2024-02-05 NOTE — PROGRESS NOTES
Subjective   Patient ID: Jaki Sarah is a 53 y.o. female who presents for thyroid cancer follow up     HPI   The patient is a 53-year-old female with  FTC f total thyroidectomy on 10/26/2023 by Dr. 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  S/p NJ I 131 104 mCi  ( TSH 31  , TG 2.4 ) ,  WBS  1/30/24 No abnormal radioiodine uptake throughout the body to suggestnodal or distant metastasis.Intense radioiodine activity in the neck seen on planar imaging,corresponding to uptake at the postsurgical bed of total thyroidectomy on SPECT CT. Additional focal radioiodine activity in the oral cavity, corresponding to uptake in the left aspect of  tongue, both are nonspecific.    History :  She states that she had a goiter all of her adult life and was following with different providers over the years however more recently, she had compressive symptoms/SOB especially when trying to sleep    Review of Systems    Objective   Physical Exam    Assessment/Plan     The patient is a 53-year-old female with  FTC f total thyroidectomy on 10/26/2023 by Dr. 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  S/p NJ I 131 104 mCi  ( TSH 31  , TG 2.4 ) ,  WBS  1/30/24 No abnormal radioiodine uptake throughout the body to suggestnodal or distant metastasis.Intense radioiodine activity in the neck seen on planar imaging,corresponding to uptake at the postsurgical bed of total thyroidectomy on SPECT CT. Additional focal radioiodine activity in the oral cavity, corresponding to uptake in the left aspect of  tongue, both are nonspecific.  Post surgical hypothyroidism on LT4 112 mcg , TFT and TG in 2 months for dose adjustment     RTC in 6 months ( going to [place] in the summer )     Further  recommendations pending above           Christie Sierra MD 02/05/24 3:39 PM

## 2024-03-05 ENCOUNTER — TELEPHONE (OUTPATIENT)
Dept: ENDOCRINOLOGY | Facility: CLINIC | Age: 54
End: 2024-03-05
Payer: COMMERCIAL

## 2024-03-05 DIAGNOSIS — E04.1 THYROID NODULE: ICD-10-CM

## 2024-03-05 DIAGNOSIS — E04.9 GOITER: ICD-10-CM

## 2024-03-05 NOTE — TELEPHONE ENCOUNTER
Jaki called for a refill on her Levothyroxine 112 mcg to her local  [facility] - 1 every day  #30

## 2024-03-06 RX ORDER — LEVOTHYROXINE SODIUM 112 UG/1
TABLET ORAL
Qty: 30 TABLET | Refills: 4 | Status: SHIPPED | OUTPATIENT
Start: 2024-03-06 | End: 2024-04-10 | Stop reason: ALTCHOICE

## 2024-04-08 ENCOUNTER — LAB (OUTPATIENT)
Dept: LAB | Facility: LAB | Age: 54
End: 2024-04-08
Payer: COMMERCIAL

## 2024-04-08 DIAGNOSIS — E89.0 POSTSURGICAL HYPOTHYROIDISM: ICD-10-CM

## 2024-04-08 LAB
T4 FREE SERPL-MCNC: 1.11 NG/DL (ref 0.78–1.48)
TSH SERPL-ACNC: 4.29 MIU/L (ref 0.44–3.98)

## 2024-04-08 PROCEDURE — 86800 THYROGLOBULIN ANTIBODY: CPT

## 2024-04-08 PROCEDURE — 84443 ASSAY THYROID STIM HORMONE: CPT

## 2024-04-08 PROCEDURE — 36415 COLL VENOUS BLD VENIPUNCTURE: CPT

## 2024-04-08 PROCEDURE — 84432 ASSAY OF THYROGLOBULIN: CPT

## 2024-04-08 PROCEDURE — 84439 ASSAY OF FREE THYROXINE: CPT

## 2024-04-10 DIAGNOSIS — C73 MALIGNANT NEOPLASM OF THYROID GLAND (MULTI): Primary | ICD-10-CM

## 2024-04-10 LAB
BILL ONLY-THYROGLOBULIN: NORMAL
THYROGLOB AB SERPL-ACNC: <0.9 IU/ML (ref 0–4)
THYROGLOB SERPL-MCNC: <0.1 NG/ML (ref 1.3–31.8)
THYROGLOB SERPL-MCNC: ABNORMAL NG/ML (ref 1.3–31.8)

## 2024-04-10 RX ORDER — LEVOTHYROXINE SODIUM 125 UG/1
125 TABLET ORAL
Qty: 90 TABLET | Refills: 1 | Status: SHIPPED | OUTPATIENT
Start: 2024-04-10 | End: 2025-04-10

## 2024-04-11 DIAGNOSIS — C73 THYROID CANCER (MULTI): ICD-10-CM

## 2024-06-13 ENCOUNTER — OFFICE VISIT (OUTPATIENT)
Dept: URGENT CARE | Facility: CLINIC | Age: 54
End: 2024-06-13
Payer: COMMERCIAL

## 2024-06-13 VITALS
TEMPERATURE: 98.7 F | DIASTOLIC BLOOD PRESSURE: 74 MMHG | HEART RATE: 69 BPM | OXYGEN SATURATION: 97 % | SYSTOLIC BLOOD PRESSURE: 128 MMHG | WEIGHT: 152 LBS | BODY MASS INDEX: 25.29 KG/M2 | RESPIRATION RATE: 20 BRPM

## 2024-06-13 DIAGNOSIS — S39.012A STRAIN OF LUMBAR REGION, INITIAL ENCOUNTER: Primary | ICD-10-CM

## 2024-06-13 DIAGNOSIS — M54.32 SCIATICA OF LEFT SIDE: ICD-10-CM

## 2024-06-13 PROCEDURE — 99213 OFFICE O/P EST LOW 20 MIN: CPT | Performed by: PHYSICIAN ASSISTANT

## 2024-06-13 RX ORDER — METHOCARBAMOL 750 MG/1
750 TABLET, FILM COATED ORAL 4 TIMES DAILY
Qty: 40 TABLET | Refills: 0 | Status: SHIPPED | OUTPATIENT
Start: 2024-06-13 | End: 2024-06-23

## 2024-06-13 RX ORDER — METHYLPREDNISOLONE 4 MG/1
TABLET ORAL
Qty: 21 TABLET | Refills: 0 | Status: SHIPPED | OUTPATIENT
Start: 2024-06-13

## 2024-06-13 ASSESSMENT — PAIN SCALES - GENERAL: PAINLEVEL: 8

## 2024-06-13 NOTE — PATIENT INSTRUCTIONS
Assessment/Plan   Problem List Items Addressed This Visit       Strain of lumbar region - Primary    Relevant Medications    methocarbamol (Robaxin) 750 mg tablet    Sciatica of left side    Relevant Medications    methylPREDNISolone (Medrol Dospak) 4 mg tablets      Patient with left lower lumbar strain and resulting left-sided sciatica after bending over yesterday.  No history of trauma to the spine or findings to suggest central cord syndrome.  Treating with Medrol pack and methocarbamol.  Patient may also take Tylenol and apply topical pain relieving gels in addition to this

## 2024-06-13 NOTE — PROGRESS NOTES
Subjective   Patient ID: Jaki Sarah is a 53 y.o. female who presents for Back Pain (Pt states she bent over to brush teeth yesterday and felt a pull to lower left back. ).  Patient denies any fevers or chills no loss of bladder or bowel control no saddle anesthesia.  She does note that she is having pain radiating down the right leg.  She has a history of similar pain in the past.  She notes that this came on without much in the way of movement that she was suspected because her injury.  Simply leaning over and fell to sharp pull in her left lower back now radiating into the left buttock and lateral aspect of the left leg.  Notes that it is worse with ambulation and moving from a seated to a standing position twisting and bending of the trunk.    Past Medical History:   Diagnosis Date    Anxiety     Asthma ([facility])     Depression     GERD (gastroesophageal reflux disease)     Goiter     Joint pain     Thyroid nodule          The remainder of the systems were reviewed and are negative unless noted above      Objective   /74   Pulse 69   Temp 37.1 °C (98.7 °F)   Resp 20   Wt 68.9 kg (152 lb)   SpO2 97%   BMI 25.29 kg/m²   Physical Exam  Constitutional:       General: She is not in acute distress.     Appearance: Normal appearance. She is not ill-appearing, toxic-appearing or diaphoretic.   HENT:      Head: Normocephalic and atraumatic.      Mouth/Throat:      Mouth: Mucous membranes are moist.      Pharynx: Oropharynx is clear.   Eyes:      Conjunctiva/sclera: Conjunctivae normal.   Cardiovascular:      Rate and Rhythm: Normal rate and regular rhythm.      Pulses: Normal pulses.      Heart sounds: No murmur heard.  Pulmonary:      Effort: Pulmonary effort is normal. No respiratory distress.      Breath sounds: Normal breath sounds. No wheezing.   Musculoskeletal:         General: Tenderness (Tenderness appreciated to the left lower lumbar paraspinal musculature, left upper buttock) present. No  swelling, deformity or signs of injury. Normal range of motion.      Cervical back: Normal range of motion and neck supple.   Skin:     General: Skin is warm and dry.      Findings: No erythema or rash.      Comments: No rash to the left lower back   Neurological:      General: No focal deficit present.      Mental Status: She is alert and oriented to person, place, and time.      Sensory: No sensory deficit.      Gait: Gait normal.         Assessment/Plan   Problem List Items Addressed This Visit       Strain of lumbar region - Primary    Relevant Medications    methocarbamol (Robaxin) 750 mg tablet    Sciatica of left side    Relevant Medications    methylPREDNISolone (Medrol Dospak) 4 mg tablets      Patient with left lower lumbar strain and resulting left-sided sciatica after bending over yesterday.  No history of trauma to the spine or findings to suggest central cord syndrome.  Treating with Medrol pack and methocarbamol.  Patient may also take Tylenol and apply topical pain relieving gels in addition to this

## 2024-08-01 ENCOUNTER — HOSPITAL ENCOUNTER (OUTPATIENT)
Dept: RADIOLOGY | Facility: HOSPITAL | Age: 54
Discharge: HOME | End: 2024-08-01
Payer: COMMERCIAL

## 2024-08-01 DIAGNOSIS — C73 MALIGNANT NEOPLASM OF THYROID GLAND (MULTI): ICD-10-CM

## 2024-08-01 PROCEDURE — 76536 US EXAM OF HEAD AND NECK: CPT

## 2024-08-05 ENCOUNTER — APPOINTMENT (OUTPATIENT)
Dept: ENDOCRINOLOGY | Facility: CLINIC | Age: 54
End: 2024-08-05
Payer: COMMERCIAL

## 2024-08-09 ENCOUNTER — APPOINTMENT (OUTPATIENT)
Dept: OTOLARYNGOLOGY | Facility: CLINIC | Age: 54
End: 2024-08-09
Payer: COMMERCIAL

## 2024-08-15 ENCOUNTER — LAB (OUTPATIENT)
Dept: LAB | Facility: LAB | Age: 54
End: 2024-08-15
Payer: COMMERCIAL

## 2024-08-15 ENCOUNTER — APPOINTMENT (OUTPATIENT)
Dept: ENDOCRINOLOGY | Facility: CLINIC | Age: 54
End: 2024-08-15
Payer: COMMERCIAL

## 2024-08-15 VITALS
DIASTOLIC BLOOD PRESSURE: 66 MMHG | BODY MASS INDEX: 24.59 KG/M2 | SYSTOLIC BLOOD PRESSURE: 108 MMHG | HEIGHT: 66 IN | WEIGHT: 153 LBS

## 2024-08-15 DIAGNOSIS — C73 THYROID CANCER (MULTI): Primary | ICD-10-CM

## 2024-08-15 DIAGNOSIS — E89.0 POSTSURGICAL HYPOTHYROIDISM: ICD-10-CM

## 2024-08-15 DIAGNOSIS — C73 THYROID CANCER (MULTI): ICD-10-CM

## 2024-08-15 PROCEDURE — 36415 COLL VENOUS BLD VENIPUNCTURE: CPT

## 2024-08-15 PROCEDURE — 99214 OFFICE O/P EST MOD 30 MIN: CPT | Performed by: STUDENT IN AN ORGANIZED HEALTH CARE EDUCATION/TRAINING PROGRAM

## 2024-08-15 PROCEDURE — 84432 ASSAY OF THYROGLOBULIN: CPT

## 2024-08-15 PROCEDURE — 86800 THYROGLOBULIN ANTIBODY: CPT

## 2024-08-15 PROCEDURE — 3008F BODY MASS INDEX DOCD: CPT | Performed by: STUDENT IN AN ORGANIZED HEALTH CARE EDUCATION/TRAINING PROGRAM

## 2024-08-15 PROCEDURE — 84439 ASSAY OF FREE THYROXINE: CPT

## 2024-08-15 PROCEDURE — 84443 ASSAY THYROID STIM HORMONE: CPT

## 2024-08-15 NOTE — PROGRESS NOTES
"Subjective   Patient ID: Jaki Sarah is a 53 y.o. female who presents for Thyroid Problem ( Jaki Sarah is a 52 y.o. female who presents for Thyroid Cancer (DX: malignant neoplasm of thyroid gland 2023 with full thyroidectomy/Family HX: Father Aunt niece/Current dose levothyroxine 125 mcg, occasional missed doses, takes correctly)   Lab Results   Component Value Date    TSH 4.29 (H) 04/08/2024        HPI  The patient is a 53-year-old female with  FTC f total thyroidectomy on 10/26/2023 by Dr. 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  S/p NJ I 131 104 mCi  ( TSH 31  , TG 2.4 ) ,  WBS  1/30/24 No abnormal radioiodine uptake throughout the body to suggestnodal or distant metastasis.Intense radioiodine activity in the neck seen on planar imaging,corresponding to uptake at the postsurgical bed of total thyroidectomy on SPECT CT. Additional focal radioiodine activity in the oral cavity, corresponding to uptake in the left aspect of tongue, both are nonspecific.     History :  She states that she had a goiter all of her adult life and was following with different providers over the years however more recently, she had compressive symptoms/SOB especially when trying to sleep    Review of Systems    Objective   Physical Exam  Constitutional:       Appearance: Normal appearance.   Neck:      Thyroid: No thyroid mass.   Cardiovascular:      Rate and Rhythm: Normal rate and regular rhythm.   Pulmonary:      Effort: Pulmonary effort is normal.      Breath sounds: Normal breath sounds.   Lymphadenopathy:      Cervical: No cervical adenopathy.   Neurological:      Mental Status: She is alert.      Visit Vitals  /66   Ht 1.676 m (5' 6\")   Wt 69.4 kg (153 lb)   BMI 24.69 kg/m²   OB Status Postmenopausal   Smoking Status Every Day   BSA 1.8 m²        Assessment/Plan        The patient is a " 53-year-old female with  FTC f total thyroidectomy on 10/26/2023 by Dr. 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  S/p NJ I 131 104 mCi  ( TSH 31  , TG 2.4 ) ,  WBS  1/30/24 No abnormal radioiodine uptake throughout the body to suggestnodal or distant metastasis.Intense radioiodine activity in the neck seen on planar imaging,corresponding to uptake at the postsurgical bed of total thyroidectomy on SPECT CT. Additional focal radioiodine activity in the oral cavity, corresponding to uptake in the left aspect oftongue, both are nonspecific.  Post surgical hypothyroidism on LT4 125 mcg ( increased from 112 mcg ) due for lab recheck today  -goal TSH 0.1   TG <0.1   No recurrence on LAP on thyroid US 8/2024     Labs today for dose adjustment    RTC in 6 months

## 2024-08-16 LAB
T4 FREE SERPL-MCNC: 0.96 NG/DL (ref 0.78–1.48)
TSH SERPL-ACNC: 13.91 MIU/L (ref 0.44–3.98)

## 2024-08-20 DIAGNOSIS — C73 MALIGNANT NEOPLASM OF THYROID GLAND (MULTI): ICD-10-CM

## 2024-08-20 RX ORDER — LEVOTHYROXINE SODIUM 125 UG/1
125 TABLET ORAL
Qty: 90 TABLET | Refills: 1 | Status: CANCELLED | OUTPATIENT
Start: 2024-08-20 | End: 2025-08-20

## 2024-08-22 DIAGNOSIS — C73 MALIGNANT NEOPLASM OF THYROID GLAND (MULTI): ICD-10-CM

## 2024-08-22 RX ORDER — LEVOTHYROXINE SODIUM 150 UG/1
150 TABLET ORAL DAILY
Qty: 90 TABLET | Refills: 1 | Status: SHIPPED | OUTPATIENT
Start: 2024-08-22

## 2024-08-22 RX ORDER — LEVOTHYROXINE SODIUM 150 UG/1
150 TABLET ORAL DAILY
COMMUNITY
End: 2024-08-22 | Stop reason: SDUPTHER

## 2025-02-17 ENCOUNTER — APPOINTMENT (OUTPATIENT)
Dept: ENDOCRINOLOGY | Facility: CLINIC | Age: 55
End: 2025-02-17
Payer: COMMERCIAL

## 2025-05-02 ENCOUNTER — TELEPHONE (OUTPATIENT)
Facility: CLINIC | Age: 55
End: 2025-05-02
Payer: COMMERCIAL

## 2025-05-02 DIAGNOSIS — C73 MALIGNANT NEOPLASM OF THYROID GLAND (MULTI): ICD-10-CM

## 2025-05-02 NOTE — TELEPHONE ENCOUNTER
Spoke with Jaki, let her know that Dr. Mobley can refill her thyroid medication but would like to recheck her tsh first. The order is in the system and she plans on going tomorrow, she is aware it is a walk in test, no fasting required. Pharmacy confirmed and will be in contact as soon as we have lab result.

## 2025-05-06 ENCOUNTER — TELEPHONE (OUTPATIENT)
Dept: OTOLARYNGOLOGY | Facility: HOSPITAL | Age: 55
End: 2025-05-06
Payer: COMMERCIAL

## 2025-05-06 NOTE — TELEPHONE ENCOUNTER
Called and left a message for Jaki, as a reminder that Dr. Mobley would like to check her tsh levels before sending in a refill of her levothyroxine. I provided my office number for return call if there are any questions or concerns. Will continue to monitor for blood work.

## 2025-05-14 ENCOUNTER — TELEPHONE (OUTPATIENT)
Facility: CLINIC | Age: 55
End: 2025-05-14
Payer: COMMERCIAL

## 2025-05-14 NOTE — TELEPHONE ENCOUNTER
Called and left another voicemail for Jaki. I wanted to touch base and see if she still needs her levothyroxine refill and as a reminder, Dr. Mobley had wanted to recheck her tsh to ensure we are giving her the proper dosage. I provided instructions on getting the bloodwork done at any  lab as a walk in test. I provided my office number for return call if she has any questions or concerns.

## 2025-05-17 LAB
T4 FREE SERPL-MCNC: 0.5 NG/DL (ref 0.8–1.8)
TSH SERPL-ACNC: 7.21 MIU/L

## 2025-05-23 DIAGNOSIS — C73 MALIGNANT NEOPLASM OF THYROID GLAND (MULTI): ICD-10-CM

## 2025-05-23 RX ORDER — LEVOTHYROXINE SODIUM 150 UG/1
150 TABLET ORAL DAILY
Qty: 30 TABLET | Refills: 0 | Status: SHIPPED | OUTPATIENT
Start: 2025-05-23 | End: 2025-06-22

## 2025-07-07 DIAGNOSIS — C73 MALIGNANT NEOPLASM OF THYROID GLAND (MULTI): ICD-10-CM

## 2025-07-08 ENCOUNTER — TELEPHONE (OUTPATIENT)
Facility: CLINIC | Age: 55
End: 2025-07-08
Payer: COMMERCIAL

## 2025-07-08 NOTE — TELEPHONE ENCOUNTER
Called and left a message for Jaki to let her know Dr. Mobley can refill her thyroid medication but would like a recheck of her tsh to ensure correct dosage. I provided instructions on bloodwork ordered and my office number for questions.

## 2025-07-10 LAB — TSH SERPL-ACNC: 0.42 MIU/L

## 2025-07-11 ENCOUNTER — TELEPHONE (OUTPATIENT)
Facility: CLINIC | Age: 55
End: 2025-07-11
Payer: COMMERCIAL

## 2025-07-11 NOTE — TELEPHONE ENCOUNTER
Called and left a message for Jaki to let her know her thyroid levels are normal and Dr. Mobley will refill with same dosage until she meets with endocrine. I provided my office number for return call if she has any questions.

## 2025-07-13 RX ORDER — LEVOTHYROXINE SODIUM 150 UG/1
150 TABLET ORAL DAILY
Qty: 90 TABLET | Refills: 1 | Status: SHIPPED | OUTPATIENT
Start: 2025-07-13 | End: 2026-01-09

## 2025-09-16 ENCOUNTER — APPOINTMENT (OUTPATIENT)
Facility: CLINIC | Age: 55
End: 2025-09-16
Payer: COMMERCIAL

## 2025-11-14 ENCOUNTER — APPOINTMENT (OUTPATIENT)
Dept: ENDOCRINOLOGY | Facility: CLINIC | Age: 55
End: 2025-11-14
Payer: COMMERCIAL

## 2026-03-03 ENCOUNTER — APPOINTMENT (OUTPATIENT)
Dept: ENDOCRINOLOGY | Facility: CLINIC | Age: 56
End: 2026-03-03
Payer: COMMERCIAL

## (undated) DEVICE — GLOVE, SURGICAL, PROTEXIS PI , 7.5, PF, LF

## (undated) DEVICE — SOLUTION, IRRIGATION, SODIUM CHLORIDE 0.9%, 1000 ML, POUR BOTTLE

## (undated) DEVICE — CORD, CAUTERY, BIPOLAR, STERILE

## (undated) DEVICE — TIP, SUCTION, YANKAUER, BULB, ADULT

## (undated) DEVICE — APPLICATOR, CHLORAPREP, W/ORANGE TINT, 26ML

## (undated) DEVICE — DRAPE, PAD, INSTRUMENT, MAGNETIC, MEDIUM, 10 X 16 IN, DISPOSABLE

## (undated) DEVICE — SUTURE, VICRYL, 3-0, 27 IN, SH

## (undated) DEVICE — CORD, BIPOLAR, 2 PIN CONNECTING

## (undated) DEVICE — EVACUATOR, WOUND, SUCTION, CLOSED, JACKSON-PRATT, 100 CC, SILICONE

## (undated) DEVICE — SPONGE, LAP, XRAY DECT, 18IN X 18IN, W/MASTER DMT, STERILE

## (undated) DEVICE — SUTURE, ETHILON, 3-0, 18 IN, PS2, BLACK

## (undated) DEVICE — PREP TRAY, VAGINAL, SKIN SCRUB, PVP-1 PAINT & 110ML PV, LF

## (undated) DEVICE — SPONGE, GAUZE, XRAY DECT, 16 PLY, 4 X 4, W/MASTER DMT,STERILE

## (undated) DEVICE — 10MM WIDE, 20CM SILICONE DRAIN, FLAT HUBLESS, FULL LENGTH PERFORATIONS

## (undated) DEVICE — NEEDLE, ELECTRODE, ELECTROSURGICAL, INSULATED

## (undated) DEVICE — ADHESIVE, SKIN, MASTISOL, 2/3 CC VIAL

## (undated) DEVICE — CONTAINER STERILE SPECIMEN 90ML, STERILE

## (undated) DEVICE — Device

## (undated) DEVICE — SUTURE, VICRYL, 3-0,18 IN, SH, UNDYED

## (undated) DEVICE — SOLUTION, IRRIGATION, STERILE WATER, 1000 ML, POUR BOTTLE

## (undated) DEVICE — STAY SET, SURGICAL , 5MM SHARP HOOK, CS/ 50

## (undated) DEVICE — DRAPE, INSTRUMENT, W/POUCH, STERI DRAPE, 7 X 11 IN, DISPOSABLE, STERILE

## (undated) DEVICE — TUBING, SUCTION, CONNECTING, NON-CONDUCTIVE, SURE GRIP CONNECTORS, 3/16 IN X 10 FT

## (undated) DEVICE — SHEARS, CURVED 9CM HARMONIC FOCUS

## (undated) DEVICE — SPONGE, DISSECTOR, PEANUT, 3/8, STERILE 5 FOAM HOLDER"

## (undated) DEVICE — PROBE, STIMULATOR, MONOPOLAR, FLUSH TIP, PRASS, W/HANDLE, STANDARD

## (undated) DEVICE — STRIP, SKIN CLOSURE, STERI STRIP, REINFORCED, 0.5 X 4 IN

## (undated) DEVICE — TOWEL PACK, STERILE, 4/PACK, BLUE

## (undated) DEVICE — 100 CC SILICONE WOUND SUCTION, JACKSON PRATT EVACUATOR

## (undated) DEVICE — SUTURE, SILK, 2-0, TIES, 12-30 IN, BLACK